# Patient Record
Sex: FEMALE | Race: WHITE | NOT HISPANIC OR LATINO | Employment: FULL TIME | URBAN - METROPOLITAN AREA
[De-identification: names, ages, dates, MRNs, and addresses within clinical notes are randomized per-mention and may not be internally consistent; named-entity substitution may affect disease eponyms.]

---

## 2017-01-12 ENCOUNTER — APPOINTMENT (OUTPATIENT)
Dept: ADMISSIONS | Facility: MEDICAL CENTER | Age: 67
End: 2017-01-12
Attending: INTERNAL MEDICINE
Payer: COMMERCIAL

## 2017-01-12 RX ORDER — ACETAMINOPHEN 160 MG
TABLET,DISINTEGRATING ORAL DAILY
COMMUNITY

## 2017-01-12 RX ORDER — CHOLECALCIFEROL (VITAMIN D3) 125 MCG
500 CAPSULE ORAL DAILY
COMMUNITY

## 2017-01-12 NOTE — OR NURSING
Preadmit appointment:  Patient instructed to continue regularly prescribed medications through day before surgery.  Instructed to take the following medications the  day of surgery with a sip of water per anesthesia protocol: Omeprazole,  Pt instructed to bring CPAP day of surgery

## 2017-01-20 ENCOUNTER — HOSPITAL ENCOUNTER (OUTPATIENT)
Facility: MEDICAL CENTER | Age: 67
End: 2017-01-20
Attending: INTERNAL MEDICINE | Admitting: INTERNAL MEDICINE
Payer: COMMERCIAL

## 2017-01-20 VITALS
TEMPERATURE: 98.8 F | WEIGHT: 191.36 LBS | SYSTOLIC BLOOD PRESSURE: 105 MMHG | OXYGEN SATURATION: 95 % | BODY MASS INDEX: 31.35 KG/M2 | DIASTOLIC BLOOD PRESSURE: 53 MMHG | HEART RATE: 79 BPM | RESPIRATION RATE: 16 BRPM

## 2017-01-20 PROBLEM — D13.2: Status: ACTIVE | Noted: 2017-01-20

## 2017-01-20 LAB
ANION GAP SERPL CALC-SCNC: 8 MMOL/L (ref 0–11.9)
BASOPHILS # BLD AUTO: 1 % (ref 0–1.8)
BASOPHILS # BLD: 0.04 K/UL (ref 0–0.12)
BUN SERPL-MCNC: 15 MG/DL (ref 8–22)
CALCIUM SERPL-MCNC: 9.6 MG/DL (ref 8.4–10.2)
CHLORIDE SERPL-SCNC: 105 MMOL/L (ref 96–112)
CO2 SERPL-SCNC: 25 MMOL/L (ref 20–33)
CREAT SERPL-MCNC: 0.92 MG/DL (ref 0.5–1.4)
EKG IMPRESSION: NORMAL
EOSINOPHIL # BLD AUTO: 0.09 K/UL (ref 0–0.51)
EOSINOPHIL NFR BLD: 2.2 % (ref 0–6.9)
ERYTHROCYTE [DISTWIDTH] IN BLOOD BY AUTOMATED COUNT: 45.2 FL (ref 35.9–50)
GFR SERPL CREATININE-BSD FRML MDRD: >60 ML/MIN/1.73 M 2
GLUCOSE SERPL-MCNC: 109 MG/DL (ref 65–99)
HCT VFR BLD AUTO: 36.4 % (ref 37–47)
HGB BLD-MCNC: 12 G/DL (ref 12–16)
IMM GRANULOCYTES # BLD AUTO: 0.01 K/UL (ref 0–0.11)
IMM GRANULOCYTES NFR BLD AUTO: 0.2 % (ref 0–0.9)
LYMPHOCYTES # BLD AUTO: 1.38 K/UL (ref 1–4.8)
LYMPHOCYTES NFR BLD: 34.2 % (ref 22–41)
MCH RBC QN AUTO: 28.4 PG (ref 27–33)
MCHC RBC AUTO-ENTMCNC: 33 G/DL (ref 33.6–35)
MCV RBC AUTO: 86.3 FL (ref 81.4–97.8)
MONOCYTES # BLD AUTO: 0.38 K/UL (ref 0–0.85)
MONOCYTES NFR BLD AUTO: 9.4 % (ref 0–13.4)
NEUTROPHILS # BLD AUTO: 2.13 K/UL (ref 2–7.15)
NEUTROPHILS NFR BLD: 53 % (ref 44–72)
NRBC # BLD AUTO: 0 K/UL
NRBC BLD AUTO-RTO: 0 /100 WBC
PATHOLOGY CONSULT NOTE: NORMAL
PLATELET # BLD AUTO: 177 K/UL (ref 164–446)
PMV BLD AUTO: 9.5 FL (ref 9–12.9)
POTASSIUM SERPL-SCNC: 3.7 MMOL/L (ref 3.6–5.5)
RBC # BLD AUTO: 4.22 M/UL (ref 4.2–5.4)
SODIUM SERPL-SCNC: 138 MMOL/L (ref 135–145)
WBC # BLD AUTO: 4 K/UL (ref 4.8–10.8)

## 2017-01-20 PROCEDURE — A4606 OXYGEN PROBE USED W OXIMETER: HCPCS | Performed by: INTERNAL MEDICINE

## 2017-01-20 PROCEDURE — 85025 COMPLETE CBC W/AUTO DIFF WBC: CPT

## 2017-01-20 PROCEDURE — 93010 ELECTROCARDIOGRAM REPORT: CPT | Performed by: INTERNAL MEDICINE

## 2017-01-20 PROCEDURE — 160035 HCHG PACU - 1ST 60 MINS PHASE I: Performed by: INTERNAL MEDICINE

## 2017-01-20 PROCEDURE — 160046 HCHG PACU - 1ST 60 MINS PHASE II: Performed by: INTERNAL MEDICINE

## 2017-01-20 PROCEDURE — 160036 HCHG PACU - EA ADDL 30 MINS PHASE I: Performed by: INTERNAL MEDICINE

## 2017-01-20 PROCEDURE — 80048 BASIC METABOLIC PNL TOTAL CA: CPT

## 2017-01-20 PROCEDURE — 160002 HCHG RECOVERY MINUTES (STAT): Performed by: INTERNAL MEDICINE

## 2017-01-20 PROCEDURE — 160009 HCHG ANES TIME/MIN: Performed by: INTERNAL MEDICINE

## 2017-01-20 PROCEDURE — 700101 HCHG RX REV CODE 250

## 2017-01-20 PROCEDURE — 700111 HCHG RX REV CODE 636 W/ 250 OVERRIDE (IP): Performed by: INTERNAL MEDICINE

## 2017-01-20 PROCEDURE — 93005 ELECTROCARDIOGRAM TRACING: CPT | Performed by: INTERNAL MEDICINE

## 2017-01-20 PROCEDURE — 160203 HCHG ENDO MINUTES - 1ST 30 MINS LEVEL 4: Performed by: INTERNAL MEDICINE

## 2017-01-20 PROCEDURE — 110371 HCHG SHELL REV 272: Performed by: INTERNAL MEDICINE

## 2017-01-20 PROCEDURE — 88305 TISSUE EXAM BY PATHOLOGIST: CPT

## 2017-01-20 PROCEDURE — 160025 RECOVERY II MINUTES (STATS): Performed by: INTERNAL MEDICINE

## 2017-01-20 PROCEDURE — 160048 HCHG OR STATISTICAL LEVEL 1-5: Performed by: INTERNAL MEDICINE

## 2017-01-20 PROCEDURE — 700111 HCHG RX REV CODE 636 W/ 250 OVERRIDE (IP)

## 2017-01-20 RX ORDER — SODIUM CHLORIDE, SODIUM LACTATE, POTASSIUM CHLORIDE, CALCIUM CHLORIDE 600; 310; 30; 20 MG/100ML; MG/100ML; MG/100ML; MG/100ML
1000 INJECTION, SOLUTION INTRAVENOUS CONTINUOUS
Status: DISCONTINUED | OUTPATIENT
Start: 2017-01-20 | End: 2017-01-20 | Stop reason: HOSPADM

## 2017-01-20 RX ORDER — EPINEPHRINE 0.1 MG/ML
SYRINGE (ML) INJECTION
Status: DISCONTINUED | OUTPATIENT
Start: 2017-01-20 | End: 2017-01-20 | Stop reason: HOSPADM

## 2017-01-20 RX ADMIN — SODIUM CHLORIDE, POTASSIUM CHLORIDE, SODIUM LACTATE AND CALCIUM CHLORIDE 1000 ML: 600; 310; 30; 20 INJECTION, SOLUTION INTRAVENOUS at 10:00

## 2017-01-20 ASSESSMENT — PAIN SCALES - GENERAL
PAINLEVEL_OUTOF10: 0

## 2017-01-20 NOTE — IP AVS SNAPSHOT
1/20/2017          Linda Walton  [address] [place] [address]    Dear :    [facility] wants to ensure your discharge home is safe and you or your loved ones have had all your questions answered regarding your care after you leave the hospital.    You may receive a telephone call within two days of your discharge.  This call is to make certain you understand your discharge instructions as well as ensure we provided you with the best care possible during your stay with us.     The call will only last approximately 3-5 minutes and will be done by a nurse.    Once again, we want to ensure your discharge home is safe and that you have a clear understanding of any next steps in your care.  If you have any questions or concerns, please do not hesitate to contact us, we are here for you.  Thank you for choosing [facility] for your healthcare needs.    Sincerely,    Nehemias Morin    [facility]

## 2017-01-20 NOTE — DISCHARGE INSTRUCTIONS
.ENDOSCOPY HOME CARE INSTRUCTIONS    GASTROSCOPY OR ERCP  1. Don't eat or drink anything for about an hour after the test. You can then resume your regular diet.  2. Don't drive or drink alcohol for 24 hours. The medication you received will make you too drowsy.  3. Don't take any coffee, tea, or aspirin products until after you see your doctor. These can harm the lining of your stomach.  4. If you begin to vomit bloody material, or develop black or bloody stools, call your doctor as soon as possible.  5. If you have any neck, chest, abdominal pain or temp of 100 degrees, call your doctor.  6. See your doctor in 7-14 days  7. Additional instructions: N/A  8. Prescriptions: None given      Dr. Barrett's Phone Number: [phone].     You should call [phone] if you develop problems with breathing or chest pain.  If any questions arise, call your doctor. If your doctor is not available, please feel free to call [phone]. You can also call the HEALTH HOTLINE open 24 hours/day, 7 days/week and speak to a nurse at [phone], or toll free [phone].    Depression / Suicide Risk    As you are discharged from this [facility] facility, it is important to learn how to keep safe from harming yourself.    Recognize the warning signs:  · Abrupt changes in personality, positive or negative- including increase in energy   · Giving away possessions  · Change in eating patterns- significant weight changes-  positive or negative  · Change in sleeping patterns- unable to sleep or sleeping all the time   · Unwillingness or inability to communicate  · Depression  · Unusual sadness, discouragement and loneliness  · Talk of wanting to die  · Neglect of personal appearance   · Rebelliousness- reckless behavior  · Withdrawal from people/activities they love  · Confusion- inability to concentrate     If you or a loved one observes any of these behaviors or has concerns about self-harm, here's what you can do:  · Talk about it-  your feelings and reasons for harming yourself  · Remove any means that you might use to hurt yourself (examples: pills, rope, extension cords, firearm)  · Get professional help from the community (Mental Health, Substance Abuse, psychological counseling)  · Do not be alone:Call your Safe Contact- someone whom you trust who will be there for you.  · Call your local CRISIS HOTLINE 116-1213 or 502-908-0195  · Call your local Children's Mobile Crisis Response Team [place] (352) 488-0063 or wwwCNS Response  · Call the toll free National Suicide Prevention Hotlines   · National Suicide Prevention Lifeline 580-648-LBRE (8885)  · National Hope Line Network 800-SUICIDE (740-9394)    I acknowledge receipt and understanding of these Home Care Instructions.    Discharge Education for patients on GENE (Obstructive Sleep Apnea) Protocol    Prior to receiving sedation or anesthesia, we screen all patients for Obstructive Sleep Apnea.  During your screening, you were identified as having suspected, but not confirmed Obstructive Sleep Apnea(GENE).    What is Obstructive Sleep Apnea?  Sleep apnea (AP-ne-ah) is a common disorder which involves breathing pauses that occur during sleep.  These can last from 10 seconds to a minute or longer.  Normal breathing resumes often with a loud snort or choking sound.    Sleep apnea occurs in all age groups and both genders but is more common in men and people over 40 years of age.  It has been estimated that as many as 18 million Americans have sleep apnea.  Most people who have sleep apnea don’t know they have it because it only occurs during sleep.  A family member and/or bed partner may first notice the signs of sleep apnea.  Sleep apnea is a chronic (ongoing) condition that disrupts the quality and quantity of your sleep repeatedly throughout the night.  This often results in excessive daytime sleepiness or fatigue during the day.  It may also contribute to high blood pressure,  heart problems, and complications following medications used for surgery and procedures.    To establish a definitive diagnosis, further testing from a specialist would be needed.  We recommend that you follow up with your primary care physician.    We recommend that you should be with an adult observer for at least 24 hours after your sedation/anesthesia.  If you have a CPAP machine, you should wear it during any sleep period (day or night) for the week following your procedure.  We encourage you to sleep on your side or in a sitting position, even with napping.  Lying flat on your back increases the risk of apnea and airway obstruction during your post procedure recovery period.    It is important to prevent over-sedation that could increase your risk for apnea.  Please take all pain medication as directed by your physician.  If you are not getting pain relief, please contact your physician to discuss possible approaches to relieving pain while minimizing medications that can affect your breathing and oxygen levels.

## 2017-01-20 NOTE — IP AVS SNAPSHOT
After Visit Summary                                                                                                                Name:Linda Walton  Medical Record Number:[id number]  CSN: [id number]    YOB: 1950   Age: 66 y.o.  Sex: female  HT:  WT: 86.8 kg (191 lb 5.8 oz)          Admit Date: 1/20/2017     Discharge Date:   Today's Date: 1/20/2017  Attending Doctor:  Edy Barrett M.D.                  Allergies:  Review of patient's allergies indicates no known allergies.                Discharge Instructions         .ENDOSCOPY HOME CARE INSTRUCTIONS    GASTROSCOPY OR ERCP  1. Don't eat or drink anything for about an hour after the test. You can then resume your regular diet.  2. Don't drive or drink alcohol for 24 hours. The medication you received will make you too drowsy.  3. Don't take any coffee, tea, or aspirin products until after you see your doctor. These can harm the lining of your stomach.  4. If you begin to vomit bloody material, or develop black or bloody stools, call your doctor as soon as possible.  5. If you have any neck, chest, abdominal pain or temp of 100 degrees, call your doctor.  6. See your doctor in 7-14 days  7. Additional instructions: N/A  8. Prescriptions: None given      Dr. Barrett's Phone Number: [phone].     You should call [phone] if you develop problems with breathing or chest pain.  If any questions arise, call your doctor. If your doctor is not available, please feel free to call [phone]. You can also call the HEALTH HOTLINE open 24 hours/day, 7 days/week and speak to a nurse at [phone], or toll free [phone].    Depression / Suicide Risk    As you are discharged from this [facility], it is important to learn how to keep safe from harming yourself.    Recognize the warning signs:  · Abrupt changes in personality, positive or negative- including increase in energy   · Giving away possessions  · Change in eating patterns-  significant weight changes-  positive or negative  · Change in sleeping patterns- unable to sleep or sleeping all the time   · Unwillingness or inability to communicate  · Depression  · Unusual sadness, discouragement and loneliness  · Talk of wanting to die  · Neglect of personal appearance   · Rebelliousness- reckless behavior  · Withdrawal from people/activities they love  · Confusion- inability to concentrate     If you or a loved one observes any of these behaviors or has concerns about self-harm, here's what you can do:  · Talk about it- your feelings and reasons for harming yourself  · Remove any means that you might use to hurt yourself (examples: pills, rope, extension cords, firearm)  · Get professional help from the community (Mental Health, Substance Abuse, psychological counseling)  · Do not be alone:Call your Safe Contact- someone whom you trust who will be there for you.  · Call your local CRISIS HOTLINE 250-1153 or 159-649-6557  · Call your local Children's Mobile Crisis Response Team [place] (314) 701-5723 or wwwSo[employer] Global  · Call the toll free National Suicide Prevention Hotlines   · National Suicide Prevention Lifeline 729-301-TORM (0682)  · National Hope Line Network 800-SUICIDE (690-0805)    I acknowledge receipt and understanding of these Home Care Instructions.    Discharge Education for patients on GENE (Obstructive Sleep Apnea) Protocol    Prior to receiving sedation or anesthesia, we screen all patients for Obstructive Sleep Apnea.  During your screening, you were identified as having suspected, but not confirmed Obstructive Sleep Apnea(GENE).    What is Obstructive Sleep Apnea?  Sleep apnea (AP-ne-ah) is a common disorder which involves breathing pauses that occur during sleep.  These can last from 10 seconds to a minute or longer.  Normal breathing resumes often with a loud snort or choking sound.    Sleep apnea occurs in all age groups and both genders but is more common in  men and people over 40 years of age.  It has been estimated that as many as 18 million Americans have sleep apnea.  Most people who have sleep apnea don’t know they have it because it only occurs during sleep.  A family member and/or bed partner may first notice the signs of sleep apnea.  Sleep apnea is a chronic (ongoing) condition that disrupts the quality and quantity of your sleep repeatedly throughout the night.  This often results in excessive daytime sleepiness or fatigue during the day.  It may also contribute to high blood pressure, heart problems, and complications following medications used for surgery and procedures.    To establish a definitive diagnosis, further testing from a specialist would be needed.  We recommend that you follow up with your primary care physician.    We recommend that you should be with an adult observer for at least 24 hours after your sedation/anesthesia.  If you have a CPAP machine, you should wear it during any sleep period (day or night) for the week following your procedure.  We encourage you to sleep on your side or in a sitting position, even with napping.  Lying flat on your back increases the risk of apnea and airway obstruction during your post procedure recovery period.    It is important to prevent over-sedation that could increase your risk for apnea.  Please take all pain medication as directed by your physician.  If you are not getting pain relief, please contact your physician to discuss possible approaches to relieving pain while minimizing medications that can affect your breathing and oxygen levels.             Medication List      ASK your doctor about these medications        Instructions    atorvastatin 40 MG Tabs   Commonly known as:  LIPITOR    Take 40 mg by mouth every evening.   Dose:  40 mg       citalopram 40 MG Tabs   Commonly known as:  CELEXA    Take 40 mg by mouth every day.   Dose:  40 mg       cyanocobalamin 500 MCG Tabs   Commonly known as:   VITAMIN B-12    Take 500 mcg by mouth every day.   Dose:  500 mcg       furosemide 40 MG Tabs   Commonly known as:  LASIX    Take 40 mg by mouth every day.   Dose:  40 mg       omeprazole 20 MG delayed-release capsule   Commonly known as:  PRILOSEC    Take 20 mg by mouth every day.   Dose:  20 mg       potassium chloride SA 20 MEQ Tbcr   Commonly known as:  K-DUR    Take 20 mEq by mouth every day.   Dose:  20 mEq       Vitamin D3 2000 UNIT Caps    Take  by mouth every day.               Medication Information     Above and/or attached are the medications your physician expects you to take upon discharge. Review all of your home medications and newly ordered medications with your doctor and/or pharmacist. Follow medication instructions as directed by your doctor and/or pharmacist. Please keep your medication list with you and share with your physician. Update the information when medications are discontinued, doses are changed, or new medications (including over-the-counter products) are added; and carry medication information at all times in the event of emergency situations.        Resources     Quit Smoking / Tobacco Use:    I understand the use of any tobacco products increases my chance of suffering from future heart disease or stroke and could cause other illnesses which may shorten my life. Quitting the use of tobacco products is the single most important thing I can do to improve my health. For further information on smoking / tobacco cessation call a Toll Free Quit Line at 1-733.217.3376 (*National Cancer [place]) or 1-593.338.8120 (American Lung Association) or you can access the web based program at www.lungusa.org.    [place] Tobacco Users Help Line:  (211) 591-4888       Toll Free: 1-460.535.1446  Quit Tobacco Program [facility] (962)766-9292    Crisis Hotline:    [place] Crisis Hotline:  6-269-TXDUWYW or 1-118.274.2553    [place] Crisis Hotline:    1-678.535.1058 or  190.884.9477    Discharge Survey:   Thank you for choosing [facility]. We hope we did everything we could to make your hospital stay a pleasant one. You may be receiving a survey and we would appreciate your time and participation in answering the questions. Your input is very valuable to us in our efforts to improve our service to our patients and their families.            Signatures     My signature on this form indicates that:    1. I acknowledge receipt and understanding of these Home Care Instruction.  2. My questions regarding this information have been answered to my satisfaction.  3. I have formulated a plan with my discharge nurse to obtain my prescribed medications for home.    __________________________________      __________________________________                   Patient Signature                                 Guardian/Responsible Adult Signature      __________________________________                 __________       ________                       Nurse Signature                                               Date                 Time

## 2017-01-20 NOTE — PROCEDURES
DATE OF SERVICES:  01/20/2017    TIME:  11:26 a.m.    ENDOSCOPIC PROCEDURES PERFORMED:  1.  Esophagogastroduodenoscopy with endoscopic mucosal resection.  2.  Esophagogastroduodenoscopy with submucosal methylene blue injection.  3.  Esophagogastroduodenoscopy with snare duodenal polypectomy.  4.  Esophagogastroduodenoscopy with Ovesco OTSC 11/6t post-polypectomy   closure.    ENDOSCOPIST:  Edy Barrett MD, [facility]    ANESTHESIA:  General anesthesia per Dr. Nathan Delgado.    CONSENT:  Risks, benefits, and alternatives were discussed with patient and   patient's .  They were given opportunity to ask questions and discuss   other options.  Risks including but not limited to perforation, infection,   bleeding, missed lesions, possible need for surgery, cardiopulmonary event,   aspiration, stroke, hospitalization, possibly prolonged, discomfort, possible   repeat procedures, additional testing, residual polyp and other potential   life-threatening complications.  Discussion was undertaken in layman's terms.    They stated clear understanding and acceptance of risks and wished to proceed   with procedures as detailed in this note.  Consent was given by them in clear   state of mind.    PROCEDURES IN DETAIL:  Diagnostic endoscope was inserted from mouth to second   portion of the duodenum.  Retroflexion was performed in stomach.  Duodenal   polyp was identified, which was sprayed with dilute methylene blue and then   injected with methylene blue with dilute epinephrine with good submucosal   lifting, a total of 13 mL was injected, and then a stiff snare was used to   remove the duodenal polyp in piecemeal fashion for endoscopic mucosal   resection snare polypectomy of cautery.  Specimens were retrieved and sent to   pathology.  The polypectomy defect was large with visible vessels and thus, an   Ovesco OTSC 11/6t was applied to post-polypectomy site engulfing the entire   site with good hemostasis and tissue  apposition.  The procedure was prolonged,   difficult, in particular the esophagogastroduodenoscopy with endoscopic   mucosal resection portion of this procedure due to the large size of the   duodenal polyp and took more than 40 minutes in total (modifier 22).  There   was suction insufflated air and stomach fluid contents upon removal.    ESOPHAGOGASTRODUODENOSCOPY FINDINGS:  1.  Esophagus:  Endoscopically unremarkable, no evidence of Bull's   esophagus.  2.  Stomach:  Incidental benign-appearing fundic gland polyps noted.  3.  Duodenum:  Duodenal flat polyp noted in the second portion of the duodenum   contralateral wall to ampulla of Vater, large size approximately 2 cm, subtle   edges with lateral spreading, previously biopsy proven to be adenoma   worrisome for low-grade dysplasia.  4.  Therapy:  Duodenal polyp removed with endoscopic mucosal resection after   submucosal methylene blue and dilute epinephrine saline injection with good   mucosal lifting.  Stiff snare polypectomy of cautery.  Specimens were   retrieved.  Post-polypectomy site closure with OTSC Ovesco 11/6t.    IMPRESSION:  1.  Large duodenal polyp, removed via endoscopic mucosal resection.  2.  Incidental gastric fundic polyps.    RECOMMENDATIONS:  1.  Follow pathology with me in clinic in about 8-10 weeks.  2.  Consider EGD for surveillance of duodenal polyp in approximately 1-2   years.       ____________________________________     MIKE BARROS MD    [facility] / [facility]    DD:  01/20/2017 11:32:45  DT:  01/20/2017 14:07:31    D#:  [id number]  Job#:  [id number]    cc: BUDDY LAROSE MD, ANNA RUSSELL PA-C, FENG ODEN DO

## 2017-01-20 NOTE — OR NURSING
1125 To PACU from [facility] via gurney, sleeping, respirations spontaneous and non-labored via OPA. Plan to keep pt in PACU for full hour per [facility] protocol.  1139 OPA removed, pt denies pain and nausea at this time. Pt falls asleep mid sentence. Will begin oxygen weaning at this time   1155 Pt resting, denies pain and nausea at this time   1210 Pt resting, denies pain and nausea at this time. Maintains oxygenation with no 02    1225 Pt meets criteria to transfer to PACU stage two

## 2017-01-23 NOTE — PROCEDURES
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    POSTOPERATIVE DIAGNOSES OR FINDINGS:  1.  Large duodenal polyp, removed via endoscopic mucosal resection.  2.  Incidental gastric fundic polyps, benign appearing.       ____________________________________     MD RAULITO TREJO / ALIDA    DD:  01/23/2017 10:34:11  DT:  01/23/2017 10:45:04    D#:  [id number]  Job#:  [id number]

## 2018-01-08 ENCOUNTER — HOSPITAL ENCOUNTER (OUTPATIENT)
Dept: LAB | Facility: MEDICAL CENTER | Age: 68
End: 2018-01-08
Attending: FAMILY MEDICINE
Payer: COMMERCIAL

## 2018-01-08 PROCEDURE — 36415 COLL VENOUS BLD VENIPUNCTURE: CPT

## 2018-01-08 PROCEDURE — 84460 ALANINE AMINO (ALT) (SGPT): CPT

## 2018-01-08 PROCEDURE — 82947 ASSAY GLUCOSE BLOOD QUANT: CPT

## 2018-01-08 PROCEDURE — 84450 TRANSFERASE (AST) (SGOT): CPT

## 2018-01-08 PROCEDURE — 80061 LIPID PANEL: CPT

## 2018-01-09 LAB
ALT SERPL-CCNC: 8 U/L (ref 2–50)
AST SERPL-CCNC: 14 U/L (ref 12–45)
CHOLEST SERPL-MCNC: 225 MG/DL (ref 100–199)
GLUCOSE SERPL-MCNC: 100 MG/DL (ref 65–99)
HDLC SERPL-MCNC: 76 MG/DL
LDLC SERPL CALC-MCNC: 137 MG/DL
TRIGL SERPL-MCNC: 58 MG/DL (ref 0–149)

## 2018-06-27 ENCOUNTER — HOSPITAL ENCOUNTER (OUTPATIENT)
Dept: LAB | Facility: MEDICAL CENTER | Age: 68
End: 2018-06-27
Attending: FAMILY MEDICINE
Payer: COMMERCIAL

## 2018-06-27 LAB
ALT SERPL-CCNC: 8 U/L (ref 2–50)
AST SERPL-CCNC: 17 U/L (ref 12–45)
CHOLEST SERPL-MCNC: 204 MG/DL (ref 100–199)
GLUCOSE SERPL-MCNC: 96 MG/DL (ref 65–99)
HDLC SERPL-MCNC: 80 MG/DL
LDLC SERPL CALC-MCNC: 109 MG/DL
TRIGL SERPL-MCNC: 77 MG/DL (ref 0–149)

## 2018-06-27 PROCEDURE — 82947 ASSAY GLUCOSE BLOOD QUANT: CPT

## 2018-06-27 PROCEDURE — 84460 ALANINE AMINO (ALT) (SGPT): CPT

## 2018-06-27 PROCEDURE — 84450 TRANSFERASE (AST) (SGOT): CPT

## 2018-06-27 PROCEDURE — 36415 COLL VENOUS BLD VENIPUNCTURE: CPT

## 2018-06-27 PROCEDURE — 80061 LIPID PANEL: CPT

## 2019-01-14 ENCOUNTER — HOSPITAL ENCOUNTER (OUTPATIENT)
Dept: LAB | Facility: MEDICAL CENTER | Age: 69
End: 2019-01-14
Attending: FAMILY MEDICINE
Payer: COMMERCIAL

## 2019-01-14 LAB
ALT SERPL-CCNC: 7 U/L (ref 2–50)
AST SERPL-CCNC: 14 U/L (ref 12–45)
CHOLEST SERPL-MCNC: 166 MG/DL (ref 100–199)
GLUCOSE SERPL-MCNC: 104 MG/DL (ref 65–99)
HDLC SERPL-MCNC: 66 MG/DL
LDLC SERPL CALC-MCNC: 87 MG/DL
TRIGL SERPL-MCNC: 67 MG/DL (ref 0–149)

## 2019-01-14 PROCEDURE — 82947 ASSAY GLUCOSE BLOOD QUANT: CPT

## 2019-01-14 PROCEDURE — 36415 COLL VENOUS BLD VENIPUNCTURE: CPT

## 2019-01-14 PROCEDURE — 84450 TRANSFERASE (AST) (SGOT): CPT

## 2019-01-14 PROCEDURE — 84460 ALANINE AMINO (ALT) (SGPT): CPT

## 2019-01-14 PROCEDURE — 80061 LIPID PANEL: CPT

## 2019-01-29 ENCOUNTER — OFFICE VISIT (OUTPATIENT)
Dept: MEDICAL GROUP | Facility: PHYSICIAN GROUP | Age: 69
End: 2019-01-29
Payer: COMMERCIAL

## 2019-01-29 VITALS
WEIGHT: 204 LBS | DIASTOLIC BLOOD PRESSURE: 78 MMHG | TEMPERATURE: 99.3 F | OXYGEN SATURATION: 100 % | BODY MASS INDEX: 32.78 KG/M2 | HEART RATE: 78 BPM | HEIGHT: 66 IN | SYSTOLIC BLOOD PRESSURE: 124 MMHG | RESPIRATION RATE: 14 BRPM

## 2019-01-29 DIAGNOSIS — R53.82 CHRONIC FATIGUE: ICD-10-CM

## 2019-01-29 DIAGNOSIS — R05.9 COUGH: ICD-10-CM

## 2019-01-29 PROCEDURE — 99214 OFFICE O/P EST MOD 30 MIN: CPT | Performed by: NURSE PRACTITIONER

## 2019-01-29 NOTE — ASSESSMENT & PLAN NOTE
Patient is a 69 y/o female with cough, hoarseness and constant throat clearing times 3 months.  Also having some fatigue but reports a chronic history of iron deficiency.  First noted that her symptoms started in November, symptoms have been waxing and waning since then.  She has not had any fevers.  She has had her flu shot and pneumonia shot.  She has no sick contacts.  She denies seasonal allergies though she does report near constant sneezing, watery eyes.  She also has reflux, on omeprazole 20 mg daily though she does admit that she has frequent reflux episodes despite daily PPI. She has not had any unintentional weight loss, drenching night sweats. No CP, SOB. She sees a regular doctor in [place], here today for a same day visit.

## 2019-01-29 NOTE — PROGRESS NOTES
[facility]  Primary Care Office Visit - Problem-Oriented        History:     Linda Walton is a 68 y.o. female who is here today to discuss Cough (x3mon SOB, sore throat, tired )      Cough  Patient is a 67 y/o female with cough, hoarseness and constant throat clearing times 3 months.  Also having some fatigue but reports a chronic history of iron deficiency.  First noted that her symptoms started in November, symptoms have been waxing and waning since then.  She has not had any fevers.  She has had her flu shot and pneumonia shot.  She has no sick contacts.  She denies seasonal allergies though she does report near constant sneezing, watery eyes.  She also has reflux, on omeprazole 20 mg daily though she does admit that she has frequent reflux episodes despite daily PPI. She has not had any unintentional weight loss, drenching night sweats. No CP, SOB. She sees a regular doctor in [place], here today for a same day visit.         Past Medical History:   Diagnosis Date   • Anxiety    • Depression    • Depression 3/24/2016   • DVT (deep venous thrombosis) (HCC) 3/24/2016    left leg   • Edema 3/24/2016   • GERD (gastroesophageal reflux disease)    • Heart burn    • Hiatus hernia syndrome    • High cholesterol    • Hyperlipidemia 3/24/2016   • Sleep apnea     CPAP   • Snoring    • Varicose vein of leg 3/24/2016     Past Surgical History:   Procedure Laterality Date   • GASTROSCOPY-ENDO N/A 1/20/2017    Procedure: GASTROSCOPY-ENDO W/ENDOSCOPIC MUCOSAL RESECTION OF DUODENAL ADENOMA AND OTHER POSSIBLE ENDOTHERAPY;  Surgeon: Edy Barrett M.D.;  Location: SURGERY [facility];  Service:    • TONSILLECTOMY  age 13     Social History     Social History   • Marital status:      Spouse name: N/A   • Number of children: N/A   • Years of education: N/A     Occupational History   • Not on file.     Social History Main Topics   • Smoking status: Former Smoker     Packs/day: 3.00     Years: 5.00      "Types: Cigarettes     Quit date: 1/1/1979   • Smokeless tobacco: Never Used   • Alcohol use 0.0 oz/week      Comment: once a month   • Drug use: No   • Sexual activity: Not Currently     Other Topics Concern   • Not on file     Social History Narrative     works from home     History   Smoking Status   • Former Smoker   • Packs/day: 3.00   • Years: 5.00   • Types: Cigarettes   • Quit date: 1/1/1979   Smokeless Tobacco   • Never Used     Family History   Problem Relation Age of Onset   • Alcohol/Drug Brother    • Cancer Father         lung   • Blood Disease Neg Hx      No Known Allergies    Problem List:     Patient Active Problem List    Diagnosis Date Noted   • Cough 01/29/2019   • 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   • DVT (deep venous thrombosis) (HCC) 03/24/2016   • Edema 03/24/2016   • Hyperlipidemia 03/24/2016   • Depression 03/24/2016   • Varicose vein of leg 03/24/2016         Medications:     Current Outpatient Prescriptions:   •  IRON PO, Take  by mouth., Disp: , Rfl:   •  Cholecalciferol (VITAMIN D3) 2000 UNIT Cap, Take  by mouth every day., Disp: , Rfl:   •  cyanocobalamin (VITAMIN B-12) 500 MCG Tab, Take 500 mcg by mouth every day., Disp: , Rfl:   •  atorvastatin (LIPITOR) 40 MG Tab, Take 40 mg by mouth every evening., Disp: , Rfl:   •  omeprazole (PRILOSEC) 20 MG CPDR, Take 20 mg by mouth every day., Disp: , Rfl:   •  furosemide (LASIX) 40 MG Tab, Take 40 mg by mouth every day., Disp: , Rfl:   •  potassium chloride SA (K-DUR) 20 MEQ Tab CR, Take 20 mEq by mouth every day., Disp: , Rfl:   •  citalopram (CELEXA) 40 MG TABS, Take 40 mg by mouth every day., Disp: , Rfl:       Review of Systems:     Pertinent positives as per HPI, all other systems reviewed and WNL     Physical Assessment:     VS: /78   Pulse 78   Temp 37.4 °C (99.3 °F)   Resp 14   Ht 1.664 m (5' 5.5\")   Wt 92.5 kg (204 lb)   SpO2 100%   BMI 33.43 kg/m²     General: Well-developed, well-nourished, female     Head: PERRL, " EOMI. Normocephalic, post oropharynx with PND otherwise WNL. Nasal mucosa erythematous and edematous. Patient clears throat throughout exam. No facial asymmetry noted.  Ears:Bilateral TMs with scant serous fluid.   Neck: Neck supple, no thyromegaly  Cardiovasc:  RRR, no MRG. No thrills or bruits. Pulses 2+ and symmetric at all distal extremities.  Pulmonary: Lungs clear bilaterally.  Normal respiratory effort. No wheeze or crackles.   Neuro: Alert and oriented, CNs II-XII intact, no focal deficits.  Skin:No rashes noted. Skin warm, dry, intact    Lymph: No cervical, pre- or post-auricular, submandibular, occipital lymphadenopathy.    Psych: Dressed appropriately for the weather, pleasant and conversant.  Affect, mood & judgment appropriate.      Assessment/Plan:    was seen today for cough.    Diagnoses and all orders for this visit:    Cough, ongoing, uncontrolled   - suspect that her cough may be multifactorial - history and PE findings particularly PND suggest that she may have underlying and untreated allergies.  Additionally, she has been having episodes of reflux which is apparently uncontrolled despite daily PPI.  Would recommend daily antihistamine along with twice daily dosing of the PPI times 2 weeks.  Follow-up with PCP if symptoms persist.    Chronic fatigue, uncontrolled  -Patient does have a history of iron deficiency, obtain labs.  We will contact the patient with results and recommendations, will defer additional workup to her PCP.  -     IRON/TOTAL IRON BIND; Future  -     CBC WITH DIFFERENTIAL; Future  -     FERRITIN; Future  -     COMP METABOLIC PANEL; Future  -     TSH WITH REFLEX TO FT4; Future  -     VITAMIN D,25 HYDROXY; Future      Patient is agreeable to the above plan and voiced understanding. All questions answered.     Please note that this dictation was created using voice recognition software. I have made every reasonable attempt to correct obvious errors, but I expect that there  are errors of grammar and possibly content that I did not discover before finalizing the note.      GLENYS Bull-JOHN  1/29/2019, 2:59 PM

## 2019-01-29 NOTE — PATIENT INSTRUCTIONS
To do:     - start prilosec twice a day + zyrtec every day x 2 weeks. If your cough doesn't improve call your doctor.

## 2019-01-30 ENCOUNTER — HOSPITAL ENCOUNTER (OUTPATIENT)
Dept: LAB | Facility: MEDICAL CENTER | Age: 69
End: 2019-01-30
Attending: NURSE PRACTITIONER
Payer: COMMERCIAL

## 2019-01-30 DIAGNOSIS — R53.82 CHRONIC FATIGUE: ICD-10-CM

## 2019-01-30 LAB
25(OH)D3 SERPL-MCNC: 26 NG/ML (ref 30–100)
ALBUMIN SERPL BCP-MCNC: 4.2 G/DL (ref 3.2–4.9)
ALBUMIN/GLOB SERPL: 1.7 G/DL
ALP SERPL-CCNC: 90 U/L (ref 30–99)
ALT SERPL-CCNC: 8 U/L (ref 2–50)
ANION GAP SERPL CALC-SCNC: 6 MMOL/L (ref 0–11.9)
ANISOCYTOSIS BLD QL SMEAR: ABNORMAL
AST SERPL-CCNC: 16 U/L (ref 12–45)
BASOPHILS # BLD AUTO: 1.8 % (ref 0–1.8)
BASOPHILS # BLD: 0.07 K/UL (ref 0–0.12)
BILIRUB SERPL-MCNC: 0.3 MG/DL (ref 0.1–1.5)
BUN SERPL-MCNC: 17 MG/DL (ref 8–22)
CALCIUM SERPL-MCNC: 9.9 MG/DL (ref 8.5–10.5)
CHLORIDE SERPL-SCNC: 109 MMOL/L (ref 96–112)
CO2 SERPL-SCNC: 26 MMOL/L (ref 20–33)
COMMENT 1642: NORMAL
CREAT SERPL-MCNC: 0.77 MG/DL (ref 0.5–1.4)
EOSINOPHIL # BLD AUTO: 0.14 K/UL (ref 0–0.51)
EOSINOPHIL NFR BLD: 3.6 % (ref 0–6.9)
ERYTHROCYTE [DISTWIDTH] IN BLOOD BY AUTOMATED COUNT: 53.7 FL (ref 35.9–50)
FASTING STATUS PATIENT QL REPORTED: NORMAL
FERRITIN SERPL-MCNC: 5.2 NG/ML (ref 10–291)
GIANT PLATELETS BLD QL SMEAR: NORMAL
GLOBULIN SER CALC-MCNC: 2.5 G/DL (ref 1.9–3.5)
GLUCOSE SERPL-MCNC: 99 MG/DL (ref 65–99)
HCT VFR BLD AUTO: 29.4 % (ref 37–47)
HGB BLD-MCNC: 7.9 G/DL (ref 12–16)
HYPOCHROMIA BLD QL SMEAR: ABNORMAL
IMM GRANULOCYTES # BLD AUTO: 0.02 K/UL (ref 0–0.11)
IMM GRANULOCYTES NFR BLD AUTO: 0.5 % (ref 0–0.9)
IRON SATN MFR SERPL: 3 % (ref 15–55)
IRON SERPL-MCNC: 13 UG/DL (ref 40–170)
LG PLATELETS BLD QL SMEAR: NORMAL
LYMPHOCYTES # BLD AUTO: 1.17 K/UL (ref 1–4.8)
LYMPHOCYTES NFR BLD: 30.1 % (ref 22–41)
MCH RBC QN AUTO: 21.2 PG (ref 27–33)
MCHC RBC AUTO-ENTMCNC: 26.9 G/DL (ref 33.6–35)
MCV RBC AUTO: 79 FL (ref 81.4–97.8)
MICROCYTES BLD QL SMEAR: ABNORMAL
MONOCYTES # BLD AUTO: 0.32 K/UL (ref 0–0.85)
MONOCYTES NFR BLD AUTO: 8.2 % (ref 0–13.4)
MORPHOLOGY BLD-IMP: NORMAL
NEUTROPHILS # BLD AUTO: 2.17 K/UL (ref 2–7.15)
NEUTROPHILS NFR BLD: 55.8 % (ref 44–72)
NRBC # BLD AUTO: 0 K/UL
NRBC BLD-RTO: 0 /100 WBC
OVALOCYTES BLD QL SMEAR: NORMAL
PLATELET # BLD AUTO: 262 K/UL (ref 164–446)
PLATELET BLD QL SMEAR: NORMAL
PMV BLD AUTO: 10.4 FL (ref 9–12.9)
POTASSIUM SERPL-SCNC: 4.3 MMOL/L (ref 3.6–5.5)
PROT SERPL-MCNC: 6.7 G/DL (ref 6–8.2)
RBC # BLD AUTO: 3.72 M/UL (ref 4.2–5.4)
RBC BLD AUTO: PRESENT
SODIUM SERPL-SCNC: 141 MMOL/L (ref 135–145)
STOMATOCYTES BLD QL SMEAR: NORMAL
TIBC SERPL-MCNC: 469 UG/DL (ref 250–450)
TSH SERPL DL<=0.005 MIU/L-ACNC: 2.23 UIU/ML (ref 0.38–5.33)
WBC # BLD AUTO: 3.9 K/UL (ref 4.8–10.8)

## 2019-01-30 PROCEDURE — 80053 COMPREHEN METABOLIC PANEL: CPT

## 2019-01-30 PROCEDURE — 36415 COLL VENOUS BLD VENIPUNCTURE: CPT

## 2019-01-30 PROCEDURE — 82306 VITAMIN D 25 HYDROXY: CPT

## 2019-01-30 PROCEDURE — 82728 ASSAY OF FERRITIN: CPT

## 2019-01-30 PROCEDURE — 83540 ASSAY OF IRON: CPT

## 2019-01-30 PROCEDURE — 84443 ASSAY THYROID STIM HORMONE: CPT

## 2019-01-30 PROCEDURE — 85025 COMPLETE CBC W/AUTO DIFF WBC: CPT

## 2019-01-30 PROCEDURE — 83550 IRON BINDING TEST: CPT

## 2019-03-08 ENCOUNTER — HOSPITAL ENCOUNTER (OUTPATIENT)
Dept: LAB | Facility: MEDICAL CENTER | Age: 69
End: 2019-03-08
Attending: INTERNAL MEDICINE
Payer: COMMERCIAL

## 2019-03-08 LAB
ANISOCYTOSIS BLD QL SMEAR: ABNORMAL
BASOPHILS # BLD AUTO: 1.7 % (ref 0–1.8)
BASOPHILS # BLD: 0.05 K/UL (ref 0–0.12)
EOSINOPHIL # BLD AUTO: 0.08 K/UL (ref 0–0.51)
EOSINOPHIL NFR BLD: 2.6 % (ref 0–6.9)
ERYTHROCYTE [DISTWIDTH] IN BLOOD BY AUTOMATED COUNT: 75.2 FL (ref 35.9–50)
FOLATE SERPL-MCNC: 20.2 NG/ML
HCT VFR BLD AUTO: 40 % (ref 37–47)
HGB BLD-MCNC: 12.4 G/DL (ref 12–16)
LYMPHOCYTES # BLD AUTO: 1.08 K/UL (ref 1–4.8)
LYMPHOCYTES NFR BLD: 33.9 % (ref 22–41)
MANUAL DIFF BLD: NORMAL
MCH RBC QN AUTO: 26.2 PG (ref 27–33)
MCHC RBC AUTO-ENTMCNC: 31 G/DL (ref 33.6–35)
MCV RBC AUTO: 84.6 FL (ref 81.4–97.8)
MICROCYTES BLD QL SMEAR: ABNORMAL
MONOCYTES # BLD AUTO: 0.28 K/UL (ref 0–0.85)
MONOCYTES NFR BLD AUTO: 8.7 % (ref 0–13.4)
MORPHOLOGY BLD-IMP: NORMAL
NEUTROPHILS # BLD AUTO: 1.7 K/UL (ref 2–7.15)
NEUTROPHILS NFR BLD: 53.1 % (ref 44–72)
NRBC # BLD AUTO: 0 K/UL
NRBC BLD-RTO: 0 /100 WBC
OVALOCYTES BLD QL SMEAR: NORMAL
PLATELET # BLD AUTO: 180 K/UL (ref 164–446)
PLATELET BLD QL SMEAR: NORMAL
PMV BLD AUTO: 10.4 FL (ref 9–12.9)
POIKILOCYTOSIS BLD QL SMEAR: NORMAL
RBC # BLD AUTO: 4.73 M/UL (ref 4.2–5.4)
RBC BLD AUTO: PRESENT
SMUDGE CELLS BLD QL SMEAR: NORMAL
VIT B12 SERPL-MCNC: 372 PG/ML (ref 211–911)
WBC # BLD AUTO: 3.2 K/UL (ref 4.8–10.8)

## 2019-03-08 PROCEDURE — 82746 ASSAY OF FOLIC ACID SERUM: CPT

## 2019-03-08 PROCEDURE — 36415 COLL VENOUS BLD VENIPUNCTURE: CPT

## 2019-03-08 PROCEDURE — 82607 VITAMIN B-12: CPT

## 2019-03-08 PROCEDURE — 85027 COMPLETE CBC AUTOMATED: CPT

## 2019-03-08 PROCEDURE — 85007 BL SMEAR W/DIFF WBC COUNT: CPT

## 2020-09-02 ENCOUNTER — APPOINTMENT (RX ONLY)
Dept: URBAN - NONMETROPOLITAN AREA CLINIC 15 | Facility: CLINIC | Age: 70
Setting detail: DERMATOLOGY
End: 2020-09-02

## 2020-09-02 DIAGNOSIS — L82.1 OTHER SEBORRHEIC KERATOSIS: ICD-10-CM

## 2020-09-02 DIAGNOSIS — D22 MELANOCYTIC NEVI: ICD-10-CM

## 2020-09-02 DIAGNOSIS — D18.0 HEMANGIOMA: ICD-10-CM

## 2020-09-02 DIAGNOSIS — Z71.89 OTHER SPECIFIED COUNSELING: ICD-10-CM

## 2020-09-02 DIAGNOSIS — L81.4 OTHER MELANIN HYPERPIGMENTATION: ICD-10-CM

## 2020-09-02 DIAGNOSIS — L91.8 OTHER HYPERTROPHIC DISORDERS OF THE SKIN: ICD-10-CM

## 2020-09-02 DIAGNOSIS — L73.8 OTHER SPECIFIED FOLLICULAR DISORDERS: ICD-10-CM

## 2020-09-02 PROBLEM — D22.71 MELANOCYTIC NEVI OF RIGHT LOWER LIMB, INCLUDING HIP: Status: ACTIVE | Noted: 2020-09-02

## 2020-09-02 PROBLEM — D18.01 HEMANGIOMA OF SKIN AND SUBCUTANEOUS TISSUE: Status: ACTIVE | Noted: 2020-09-02

## 2020-09-02 PROBLEM — D22.61 MELANOCYTIC NEVI OF RIGHT UPPER LIMB, INCLUDING SHOULDER: Status: ACTIVE | Noted: 2020-09-02

## 2020-09-02 PROBLEM — D22.5 MELANOCYTIC NEVI OF TRUNK: Status: ACTIVE | Noted: 2020-09-02

## 2020-09-02 PROBLEM — D22.72 MELANOCYTIC NEVI OF LEFT LOWER LIMB, INCLUDING HIP: Status: ACTIVE | Noted: 2020-09-02

## 2020-09-02 PROBLEM — D22.62 MELANOCYTIC NEVI OF LEFT UPPER LIMB, INCLUDING SHOULDER: Status: ACTIVE | Noted: 2020-09-02

## 2020-09-02 PROCEDURE — 99203 OFFICE O/P NEW LOW 30 MIN: CPT

## 2020-09-02 PROCEDURE — ? LIQUID NITROGEN

## 2020-09-02 PROCEDURE — ? COUNSELING

## 2020-09-02 ASSESSMENT — LOCATION DETAILED DESCRIPTION DERM
LOCATION DETAILED: RIGHT ANTECUBITAL SKIN
LOCATION DETAILED: RIGHT POPLITEAL SKIN
LOCATION DETAILED: RIGHT CLAVICULAR NECK
LOCATION DETAILED: RIGHT ANTERIOR SHOULDER
LOCATION DETAILED: RIGHT INFERIOR CENTRAL MALAR CHEEK
LOCATION DETAILED: INFERIOR THORACIC SPINE
LOCATION DETAILED: RIGHT VENTRAL PROXIMAL FOREARM
LOCATION DETAILED: LEFT ANTERIOR PROXIMAL UPPER ARM
LOCATION DETAILED: XIPHOID
LOCATION DETAILED: RIGHT AXILLARY VAULT
LOCATION DETAILED: LEFT VENTRAL LATERAL PROXIMAL FOREARM
LOCATION DETAILED: LEFT INFERIOR LATERAL MIDBACK
LOCATION DETAILED: LEFT SUPERIOR UPPER BACK
LOCATION DETAILED: RIGHT ANTERIOR DISTAL UPPER ARM
LOCATION DETAILED: SUBXIPHOID
LOCATION DETAILED: LEFT INFERIOR MEDIAL UPPER BACK
LOCATION DETAILED: RIGHT ANTERIOR PROXIMAL UPPER ARM
LOCATION DETAILED: LEFT PROXIMAL PRETIBIAL REGION
LOCATION DETAILED: LEFT ANTERIOR DISTAL UPPER ARM
LOCATION DETAILED: LEFT DISTAL POSTERIOR THIGH
LOCATION DETAILED: RIGHT PROXIMAL PRETIBIAL REGION
LOCATION DETAILED: LEFT SUPERIOR MEDIAL FOREHEAD
LOCATION DETAILED: LEFT POSTERIOR SHOULDER
LOCATION DETAILED: RIGHT DISTAL POSTERIOR THIGH
LOCATION DETAILED: LEFT POPLITEAL SKIN
LOCATION DETAILED: LEFT SUPERIOR MEDIAL MIDBACK
LOCATION DETAILED: RIGHT FOREHEAD
LOCATION DETAILED: LEFT VENTRAL PROXIMAL FOREARM
LOCATION DETAILED: MIDDLE STERNUM
LOCATION DETAILED: LEFT MID-UPPER BACK
LOCATION DETAILED: RIGHT MEDIAL SUPERIOR CHEST
LOCATION DETAILED: LEFT LATERAL PROXIMAL PRETIBIAL REGION

## 2020-09-02 ASSESSMENT — LOCATION SIMPLE DESCRIPTION DERM
LOCATION SIMPLE: LEFT FOREARM
LOCATION SIMPLE: RIGHT POSTERIOR THIGH
LOCATION SIMPLE: RIGHT FOREARM
LOCATION SIMPLE: LEFT LOWER BACK
LOCATION SIMPLE: LEFT UPPER ARM
LOCATION SIMPLE: LEFT SHOULDER
LOCATION SIMPLE: RIGHT UPPER ARM
LOCATION SIMPLE: RIGHT AXILLARY VAULT
LOCATION SIMPLE: LEFT POSTERIOR THIGH
LOCATION SIMPLE: RIGHT POPLITEAL SKIN
LOCATION SIMPLE: ABDOMEN
LOCATION SIMPLE: RIGHT PRETIBIAL REGION
LOCATION SIMPLE: RIGHT SHOULDER
LOCATION SIMPLE: LEFT UPPER BACK
LOCATION SIMPLE: UPPER BACK
LOCATION SIMPLE: RIGHT ANTERIOR NECK
LOCATION SIMPLE: RIGHT FOREHEAD
LOCATION SIMPLE: CHEST
LOCATION SIMPLE: LEFT PRETIBIAL REGION
LOCATION SIMPLE: LEFT POPLITEAL SKIN
LOCATION SIMPLE: LEFT FOREHEAD
LOCATION SIMPLE: RIGHT CHEEK

## 2020-09-02 ASSESSMENT — LOCATION ZONE DERM
LOCATION ZONE: NECK
LOCATION ZONE: ARM
LOCATION ZONE: TRUNK
LOCATION ZONE: FACE
LOCATION ZONE: LEG
LOCATION ZONE: AXILLAE

## 2020-09-02 NOTE — PROCEDURE: LIQUID NITROGEN
Duration Of Freeze Thaw-Cycle (Seconds): 0
Render Post-Care Instructions In Note?: no
Post-Care Instructions: I reviewed with the patient in detail post-care instructions. Patient is to wear sunprotection, and avoid picking at any of the treated lesions. Pt may apply Vaseline to crusted or scabbing areas.
Consent: The patient's consent was obtained including but not limited to risks of crusting, scabbing, blistering, scarring, darker or lighter pigmentary change, recurrence, incomplete removal and infection.
Detail Level: Detailed
Medical Necessity Clause: This procedure was medically necessary because the lesions that were treated were:  If lesion does not resolve, bx is needed.
Medical Necessity Information: It is in your best interest to select a reason for this procedure from the list below. All of these items fulfill various CMS LCD requirements except the new and changing color options.

## 2020-09-02 NOTE — HPI: FULL BODY SKIN EXAMINATION
How Severe Are Your Spot(S)?: moderate
What Type Of Note Output Would You Prefer (Optional)?: Standard Output
What Is The Reason For Today's Visit?: Full Body Skin Examination
What Is The Reason For Today's Visit? (Being Monitored For X): concerning skin lesions on an annual basis
no weight-bearing restrictions

## 2020-11-09 NOTE — OR NURSING
Chief Complaint   Patient presents with     RECHECK     followup to repeat right knee aspiration and lab work        82 year old  1938            Pain Assessment  Patient Currently in Pain: No(no pain per pt)                [facility] - [place], [place] - [address]  [id number] - [place], [place] - [address] AT [facility] [address]  [facility] - [place], [place] - [address]    Allergies   Allergen Reactions     Colchicine      Other reaction(s): Gastrointestinal  diarrhea     Niacin      skin rash     Ferrous Sulfate Rash       Current Outpatient Medications   Medication     levothyroxine (LEVOTHROID) 75 MCG tablet     acetaminophen (TYLENOL) 325 MG tablet     amoxicillin (AMOXIL) 500 MG tablet     amoxicillin-clavulanate (AUGMENTIN) 500-125 MG tablet     aspirin (ASA) 325 MG EC tablet     oxyCODONE (ROXICODONE) 5 MG tablet     OXYCODONE HCL PO     senna-docusate (SENOKOT-S/PERICOLACE) 8.6-50 MG tablet     sulfamethoxazole-trimethoprim (BACTRIM DS) 800-160 MG tablet     No current facility-administered medications for this visit.              KOOS Knee Survey Assessment    Knee Outcome Survey ADL Scale (Radha, JTORIN; Ayesha, L; Hector, RS; Aristides, FH; Marisel, CD; 1998) 7/9/2018   Do you have swelling in your knee? Often   Do you feel grinding, hear clicking or any other type of noise when your knee moves? Never   Does your knee catch or hang up when moving? Never   Can you straighten your knee fully? Always   Can you bend your knee fully? Always   How severe is your knee joint stiffness after first wakening in the morning? Mild   How severe is your knee stiffness after sitting, lying or resting LATER IN THE DAY? Mild   How often do you experience knee pain? Daily   Twisting/pivoting on your knee None   Straightening knee fully None   Bending knee fully None   Walking on flat surface None   Going up or down stairs Mild   At night  Patient allergies and NPO status verified, home medication reconciliation completed, belongings secured. Patient verbalizes understanding of pain scale, expected course of stay and plan of care. Surgical site verified with patient, IV access established. Pt awaiting surgery.      while in bed None   Sitting or lying None   Standing upright None   Descending stairs Moderate   Ascending stairs Moderate   Rising from sitting None   Standing None   Bending to floor/ an object None   Walking on flat surface None   Getting in/out of car None   Going shopping Moderate   Putting on socks/stockings None   Rising from bed None   Taking off socks/stockings None   Lying in bed (turning over, maintaining knee position) None   Getting in/out of bath None   Sitting None   Getting on/off toilet None   Heavy domestic duties (moving heavy boxes, scrubbing floors, etc) Mild   Light domestic duties (cooking, dusting, etc) None   Squatting Extreme   Running Extreme   Jumping Extreme   Twisting/pivoting on your injured knee Moderate   Kneeling Moderate   How often are you aware of your knee problem? Daily   Have you modified your life style to avoid potentially damaging activities to your knee? Totally   How much are you troubled with lack of confidence in your knee? Mildly   In general, how much difficulty do you have with your knee? Mild   Pain Score 88.88   Symptoms Score 53.57   Function, Daily Living Score 89.7   Sports and Rec Score 20   Quality of Life Score 43.75              Promis 10 Assessment    PROMIS 10 9/6/2020   In general, would you say your health is: Very good   In general, would you say your quality of life is: Very good   In general, how would you rate your physical health? Very good   In general, how would you rate your mental health, including your mood and your ability to think? Very good   In general, how would you rate your satisfaction with your social activities and relationships? Very good   In general, please rate how well you carry out your usual social activities and roles Very good   To what extent are you able to carry out your everyday physical activities such as walking, climbing stairs, carrying groceries, or moving a chair? A little   How often have you been bothered  by emotional problems such as feeling anxious, depressed or irritable? Rarely   How would you rate your fatigue on average? Mild   How would you rate your pain on average?   0 = No Pain  to  10 = Worst Imaginable Pain 1   In general, would you say your health is: 4   In general, would you say your quality of life is: 4   In general, how would you rate your physical health? 4   In general, how would you rate your mental health, including your mood and your ability to think? 4   In general, how would you rate your satisfaction with your social activities and relationships? 4   In general, please rate how well you carry out your usual social activities and roles. (This includes activities at home, at work and in your community, and responsibilities as a parent, child, spouse, employee, friend, etc.) 4   To what extent are you able to carry out your everyday physical activities such as walking, climbing stairs, carrying groceries, or moving a chair? 2   In the past 7 days, how often have you been bothered by emotional problems such as feeling anxious, depressed, or irritable? 2   In the past 7 days, how would you rate your fatigue on average? 2   In the past 7 days, how would you rate your pain on average, where 0 means no pain, and 10 means worst imaginable pain? 1   Global Mental Health Score 16   Global Physical Health Score 14   PROMIS TOTAL - SUBSCORES 30   Some recent data might be hidden              Ortho Oxford Knee Questionnaire    Oxford Knee Score (  [employer], 1998. All rights reserved) - Problems with knee during last 4 Weeks 9/6/2020   1.  How would you describe the pain you usually have from your knee? Very Mild   2.  Have you had any trouble with washing and drying yourself (all over) because of your knee? No trouble at all   3.  Have you had any trouble getting in and out of a car or using public transportation because of your knee? (whichever you would tend to use) Very little trouble  "  4.  For how long have you been able to walk before pain from you knee becomes severe? (with or without cane) Around the house only   5.  After a meal (sitting at a table), how painful has it been for you to stand up from a chair because of your knee? Not at all painful   6.  Have you been limping when walking because of your knee? All of the time   7.  Could you kneel down and get up again afterwards? With moderate difficulty   8.  Have you been troubled by pain from your knee in bed at night? Only 1 or 2 nights   9.  How much has pain from your knee interfered with your usual work (including housework)? Moderately   10. Have you felt that your knee might suddenly \"give out\" or let you down? Rarely/never   11. Could you do the grocery shopping on your own? No, impossible   12. Could you walk down one flight of stairs? With extreme difficulty   OXFORD TOTAL SCORE 27          "

## 2021-03-03 ENCOUNTER — APPOINTMENT (RX ONLY)
Dept: URBAN - NONMETROPOLITAN AREA CLINIC 15 | Facility: CLINIC | Age: 71
Setting detail: DERMATOLOGY
End: 2021-03-03

## 2021-03-03 DIAGNOSIS — L81.4 OTHER MELANIN HYPERPIGMENTATION: ICD-10-CM

## 2021-03-03 DIAGNOSIS — L82.1 OTHER SEBORRHEIC KERATOSIS: ICD-10-CM

## 2021-03-03 DIAGNOSIS — L57.0 ACTINIC KERATOSIS: ICD-10-CM

## 2021-03-03 DIAGNOSIS — D22 MELANOCYTIC NEVI: ICD-10-CM

## 2021-03-03 DIAGNOSIS — D18.0 HEMANGIOMA: ICD-10-CM

## 2021-03-03 PROBLEM — D22.72 MELANOCYTIC NEVI OF LEFT LOWER LIMB, INCLUDING HIP: Status: ACTIVE | Noted: 2021-03-03

## 2021-03-03 PROBLEM — D22.71 MELANOCYTIC NEVI OF RIGHT LOWER LIMB, INCLUDING HIP: Status: ACTIVE | Noted: 2021-03-03

## 2021-03-03 PROBLEM — D22.61 MELANOCYTIC NEVI OF RIGHT UPPER LIMB, INCLUDING SHOULDER: Status: ACTIVE | Noted: 2021-03-03

## 2021-03-03 PROBLEM — D22.62 MELANOCYTIC NEVI OF LEFT UPPER LIMB, INCLUDING SHOULDER: Status: ACTIVE | Noted: 2021-03-03

## 2021-03-03 PROBLEM — D18.01 HEMANGIOMA OF SKIN AND SUBCUTANEOUS TISSUE: Status: ACTIVE | Noted: 2021-03-03

## 2021-03-03 PROBLEM — D22.5 MELANOCYTIC NEVI OF TRUNK: Status: ACTIVE | Noted: 2021-03-03

## 2021-03-03 PROCEDURE — 17000 DESTRUCT PREMALG LESION: CPT

## 2021-03-03 PROCEDURE — ? LIQUID NITROGEN

## 2021-03-03 PROCEDURE — ? COUNSELING

## 2021-03-03 PROCEDURE — 99213 OFFICE O/P EST LOW 20 MIN: CPT | Mod: 25

## 2021-03-03 ASSESSMENT — LOCATION SIMPLE DESCRIPTION DERM
LOCATION SIMPLE: NOSE
LOCATION SIMPLE: LEFT POSTERIOR THIGH
LOCATION SIMPLE: LEFT PRETIBIAL REGION
LOCATION SIMPLE: LEFT FOREHEAD
LOCATION SIMPLE: RIGHT PRETIBIAL REGION
LOCATION SIMPLE: LEFT ELBOW
LOCATION SIMPLE: LEFT POPLITEAL SKIN
LOCATION SIMPLE: ABDOMEN
LOCATION SIMPLE: RIGHT THIGH
LOCATION SIMPLE: RIGHT FOREARM
LOCATION SIMPLE: UPPER BACK
LOCATION SIMPLE: RIGHT EYEBROW
LOCATION SIMPLE: LEFT FOREARM
LOCATION SIMPLE: LEFT POSTERIOR UPPER ARM
LOCATION SIMPLE: RIGHT UPPER BACK
LOCATION SIMPLE: CHEST
LOCATION SIMPLE: RIGHT POSTERIOR UPPER ARM
LOCATION SIMPLE: RIGHT POSTERIOR THIGH
LOCATION SIMPLE: RIGHT POPLITEAL SKIN

## 2021-03-03 ASSESSMENT — LOCATION DETAILED DESCRIPTION DERM
LOCATION DETAILED: RIGHT ANTERIOR DISTAL THIGH
LOCATION DETAILED: RIGHT SUPERIOR UPPER BACK
LOCATION DETAILED: RIGHT DISTAL POSTERIOR UPPER ARM
LOCATION DETAILED: NASAL DORSUM
LOCATION DETAILED: LEFT LATERAL ELBOW
LOCATION DETAILED: RIGHT LATERAL SUPERIOR CHEST
LOCATION DETAILED: MIDDLE STERNUM
LOCATION DETAILED: RIGHT CENTRAL EYEBROW
LOCATION DETAILED: RIGHT VENTRAL PROXIMAL FOREARM
LOCATION DETAILED: SUBXIPHOID
LOCATION DETAILED: RIGHT PROXIMAL DORSAL FOREARM
LOCATION DETAILED: INFERIOR THORACIC SPINE
LOCATION DETAILED: LEFT DISTAL POSTERIOR THIGH
LOCATION DETAILED: RIGHT DISTAL POSTERIOR THIGH
LOCATION DETAILED: RIGHT POPLITEAL SKIN
LOCATION DETAILED: LEFT DISTAL DORSAL FOREARM
LOCATION DETAILED: LEFT VENTRAL LATERAL PROXIMAL FOREARM
LOCATION DETAILED: LEFT DISTAL POSTERIOR UPPER ARM
LOCATION DETAILED: RIGHT PROXIMAL POSTERIOR UPPER ARM
LOCATION DETAILED: LEFT PROXIMAL PRETIBIAL REGION
LOCATION DETAILED: LEFT POPLITEAL SKIN
LOCATION DETAILED: LEFT INFERIOR FOREHEAD
LOCATION DETAILED: LOWER STERNUM
LOCATION DETAILED: RIGHT PROXIMAL PRETIBIAL REGION

## 2021-03-03 ASSESSMENT — LOCATION ZONE DERM
LOCATION ZONE: LEG
LOCATION ZONE: TRUNK
LOCATION ZONE: ARM
LOCATION ZONE: FACE
LOCATION ZONE: NOSE

## 2021-03-03 NOTE — PROCEDURE: COUNSELING
Detail Level: Zone
Patient Specific Counseling (Will Not Stick From Patient To Patient): Recommended Eucerin rough and bumpy or urea cream; patient states that Cera Ve is not helping.  Discussed that if creams do not help then can cryo lesions, but concerned that is will not heal well due to the location.

## 2022-11-22 ENCOUNTER — APPOINTMENT (RX ONLY)
Dept: URBAN - NONMETROPOLITAN AREA CLINIC 15 | Facility: CLINIC | Age: 72
Setting detail: DERMATOLOGY
End: 2022-11-22

## 2022-11-22 DIAGNOSIS — L82.1 OTHER SEBORRHEIC KERATOSIS: ICD-10-CM

## 2022-11-22 DIAGNOSIS — L73.8 OTHER SPECIFIED FOLLICULAR DISORDERS: ICD-10-CM

## 2022-11-22 DIAGNOSIS — D22 MELANOCYTIC NEVI: ICD-10-CM

## 2022-11-22 DIAGNOSIS — L81.4 OTHER MELANIN HYPERPIGMENTATION: ICD-10-CM

## 2022-11-22 DIAGNOSIS — D18.0 HEMANGIOMA: ICD-10-CM

## 2022-11-22 PROBLEM — D22.71 MELANOCYTIC NEVI OF RIGHT LOWER LIMB, INCLUDING HIP: Status: ACTIVE | Noted: 2022-11-22

## 2022-11-22 PROBLEM — D22.62 MELANOCYTIC NEVI OF LEFT UPPER LIMB, INCLUDING SHOULDER: Status: ACTIVE | Noted: 2022-11-22

## 2022-11-22 PROBLEM — D18.01 HEMANGIOMA OF SKIN AND SUBCUTANEOUS TISSUE: Status: ACTIVE | Noted: 2022-11-22

## 2022-11-22 PROBLEM — D22.72 MELANOCYTIC NEVI OF LEFT LOWER LIMB, INCLUDING HIP: Status: ACTIVE | Noted: 2022-11-22

## 2022-11-22 PROBLEM — D22.5 MELANOCYTIC NEVI OF TRUNK: Status: ACTIVE | Noted: 2022-11-22

## 2022-11-22 PROBLEM — D22.61 MELANOCYTIC NEVI OF RIGHT UPPER LIMB, INCLUDING SHOULDER: Status: ACTIVE | Noted: 2022-11-22

## 2022-11-22 PROCEDURE — ? COUNSELING

## 2022-11-22 PROCEDURE — ? LIQUID NITROGEN

## 2022-11-22 PROCEDURE — 99213 OFFICE O/P EST LOW 20 MIN: CPT

## 2022-11-22 ASSESSMENT — LOCATION DETAILED DESCRIPTION DERM
LOCATION DETAILED: RIGHT SUPERIOR MEDIAL UPPER BACK
LOCATION DETAILED: RIGHT MID-UPPER BACK
LOCATION DETAILED: RIGHT DISTAL POSTERIOR UPPER ARM
LOCATION DETAILED: SUBXIPHOID
LOCATION DETAILED: RIGHT SUPERIOR LATERAL MIDBACK
LOCATION DETAILED: INFERIOR THORACIC SPINE
LOCATION DETAILED: RIGHT INFRAMAMMARY CREASE (INNER QUADRANT)
LOCATION DETAILED: LEFT PROXIMAL PRETIBIAL REGION
LOCATION DETAILED: RIGHT INFERIOR LATERAL UPPER BACK
LOCATION DETAILED: RIGHT LATERAL UPPER BACK
LOCATION DETAILED: RIGHT PROXIMAL DORSAL FOREARM
LOCATION DETAILED: LEFT SUPERIOR LATERAL MIDBACK
LOCATION DETAILED: LEFT MID-UPPER BACK
LOCATION DETAILED: MIDDLE STERNUM
LOCATION DETAILED: RIGHT VENTRAL PROXIMAL FOREARM
LOCATION DETAILED: RIGHT PROXIMAL PRETIBIAL REGION
LOCATION DETAILED: LEFT LATERAL ELBOW
LOCATION DETAILED: LEFT VENTRAL LATERAL PROXIMAL FOREARM
LOCATION DETAILED: RIGHT MEDIAL BREAST 5-6:00 REGION
LOCATION DETAILED: NASAL SUPRATIP
LOCATION DETAILED: LEFT INFERIOR FOREHEAD
LOCATION DETAILED: RIGHT DISTAL POSTERIOR THIGH
LOCATION DETAILED: LEFT INFERIOR LATERAL UPPER BACK
LOCATION DETAILED: EPIGASTRIC SKIN
LOCATION DETAILED: LEFT DISTAL DORSAL FOREARM
LOCATION DETAILED: LEFT INFERIOR UPPER BACK
LOCATION DETAILED: RIGHT LATERAL SUPERIOR CHEST
LOCATION DETAILED: RIGHT CENTRAL EYEBROW
LOCATION DETAILED: RIGHT RIB CAGE
LOCATION DETAILED: LEFT POPLITEAL SKIN
LOCATION DETAILED: RIGHT PROXIMAL POSTERIOR UPPER ARM
LOCATION DETAILED: LOWER STERNUM
LOCATION DETAILED: RIGHT POPLITEAL SKIN
LOCATION DETAILED: LEFT DISTAL POSTERIOR UPPER ARM
LOCATION DETAILED: RIGHT ANTERIOR DISTAL THIGH
LOCATION DETAILED: LEFT DISTAL POSTERIOR THIGH
LOCATION DETAILED: LEFT LATERAL UPPER BACK
LOCATION DETAILED: RIGHT SUPERIOR UPPER BACK

## 2022-11-22 ASSESSMENT — LOCATION SIMPLE DESCRIPTION DERM
LOCATION SIMPLE: LEFT POSTERIOR UPPER ARM
LOCATION SIMPLE: RIGHT POPLITEAL SKIN
LOCATION SIMPLE: LEFT ELBOW
LOCATION SIMPLE: RIGHT POSTERIOR THIGH
LOCATION SIMPLE: UPPER BACK
LOCATION SIMPLE: LEFT UPPER BACK
LOCATION SIMPLE: LEFT FOREARM
LOCATION SIMPLE: RIGHT LOWER BACK
LOCATION SIMPLE: NOSE
LOCATION SIMPLE: LEFT POPLITEAL SKIN
LOCATION SIMPLE: LEFT LOWER BACK
LOCATION SIMPLE: ABDOMEN
LOCATION SIMPLE: RIGHT THIGH
LOCATION SIMPLE: RIGHT POSTERIOR UPPER ARM
LOCATION SIMPLE: CHEST
LOCATION SIMPLE: LEFT PRETIBIAL REGION
LOCATION SIMPLE: RIGHT FOREARM
LOCATION SIMPLE: LEFT POSTERIOR THIGH
LOCATION SIMPLE: LEFT FOREHEAD
LOCATION SIMPLE: RIGHT BREAST
LOCATION SIMPLE: RIGHT EYEBROW
LOCATION SIMPLE: RIGHT PRETIBIAL REGION
LOCATION SIMPLE: RIGHT UPPER BACK

## 2022-11-22 ASSESSMENT — LOCATION ZONE DERM
LOCATION ZONE: LEG
LOCATION ZONE: ARM
LOCATION ZONE: TRUNK
LOCATION ZONE: NOSE
LOCATION ZONE: FACE

## 2022-11-22 NOTE — PROCEDURE: LIQUID NITROGEN
Render Post-Care Instructions In Note?: no
Post-Care Instructions: I reviewed with the patient in detail post-care instructions. Patient is to wear sunprotection, and avoid picking at any of the treated lesions. Pt may apply Vaseline to crusted or scabbing areas.
Medical Necessity Information: It is in your best interest to select a reason for this procedure from the list below. All of these items fulfill various CMS LCD requirements except the new and changing color options.
Show Spray Paint Technique Variable?: Yes
Detail Level: Detailed
Spray Paint Text: The liquid nitrogen was applied to the skin utilizing a spray paint frosting technique.
Duration Of Freeze Thaw-Cycle (Seconds): 0
Medical Necessity Clause: This procedure was medically necessary because the lesions that were treated were:  If lesion does not resolve, bx is needed.
Consent: The patient's consent was obtained including but not limited to risks of crusting, scabbing, blistering, scarring, darker or lighter pigmentary change, recurrence, incomplete removal and infection.

## 2023-11-22 ENCOUNTER — APPOINTMENT (RX ONLY)
Dept: URBAN - NONMETROPOLITAN AREA CLINIC 15 | Facility: CLINIC | Age: 73
Setting detail: DERMATOLOGY
End: 2023-11-22

## 2023-11-22 DIAGNOSIS — L57.0 ACTINIC KERATOSIS: ICD-10-CM

## 2023-11-22 DIAGNOSIS — I78.8 OTHER DISEASES OF CAPILLARIES: ICD-10-CM

## 2023-11-22 DIAGNOSIS — L81.4 OTHER MELANIN HYPERPIGMENTATION: ICD-10-CM

## 2023-11-22 DIAGNOSIS — L82.1 OTHER SEBORRHEIC KERATOSIS: ICD-10-CM

## 2023-11-22 DIAGNOSIS — D18.0 HEMANGIOMA: ICD-10-CM

## 2023-11-22 DIAGNOSIS — I83.9 ASYMPTOMATIC VARICOSE VEINS OF LOWER EXTREMITIES: ICD-10-CM

## 2023-11-22 DIAGNOSIS — D22 MELANOCYTIC NEVI: ICD-10-CM

## 2023-11-22 PROBLEM — D22.62 MELANOCYTIC NEVI OF LEFT UPPER LIMB, INCLUDING SHOULDER: Status: ACTIVE | Noted: 2023-11-22

## 2023-11-22 PROBLEM — D22.61 MELANOCYTIC NEVI OF RIGHT UPPER LIMB, INCLUDING SHOULDER: Status: ACTIVE | Noted: 2023-11-22

## 2023-11-22 PROBLEM — D22.72 MELANOCYTIC NEVI OF LEFT LOWER LIMB, INCLUDING HIP: Status: ACTIVE | Noted: 2023-11-22

## 2023-11-22 PROBLEM — D22.5 MELANOCYTIC NEVI OF TRUNK: Status: ACTIVE | Noted: 2023-11-22

## 2023-11-22 PROBLEM — I83.92 ASYMPTOMATIC VARICOSE VEINS OF LEFT LOWER EXTREMITY: Status: ACTIVE | Noted: 2023-11-22

## 2023-11-22 PROBLEM — D22.71 MELANOCYTIC NEVI OF RIGHT LOWER LIMB, INCLUDING HIP: Status: ACTIVE | Noted: 2023-11-22

## 2023-11-22 PROBLEM — D18.01 HEMANGIOMA OF SKIN AND SUBCUTANEOUS TISSUE: Status: ACTIVE | Noted: 2023-11-22

## 2023-11-22 PROCEDURE — 99213 OFFICE O/P EST LOW 20 MIN: CPT | Mod: 25

## 2023-11-22 PROCEDURE — ? COUNSELING

## 2023-11-22 PROCEDURE — 17000 DESTRUCT PREMALG LESION: CPT

## 2023-11-22 PROCEDURE — ? LIQUID NITROGEN

## 2023-11-22 ASSESSMENT — LOCATION SIMPLE DESCRIPTION DERM
LOCATION SIMPLE: RIGHT POSTERIOR THIGH
LOCATION SIMPLE: LEFT FOREHEAD
LOCATION SIMPLE: RIGHT POSTERIOR UPPER ARM
LOCATION SIMPLE: LEFT POSTERIOR THIGH
LOCATION SIMPLE: LEFT FOREARM
LOCATION SIMPLE: LEFT UPPER BACK
LOCATION SIMPLE: RIGHT POPLITEAL SKIN
LOCATION SIMPLE: CHEST
LOCATION SIMPLE: LOWER BACK
LOCATION SIMPLE: LEFT PRETIBIAL REGION
LOCATION SIMPLE: UPPER BACK
LOCATION SIMPLE: RIGHT FOREARM
LOCATION SIMPLE: ABDOMEN
LOCATION SIMPLE: RIGHT PRETIBIAL REGION
LOCATION SIMPLE: RIGHT UPPER BACK
LOCATION SIMPLE: NOSE
LOCATION SIMPLE: LEFT ELBOW
LOCATION SIMPLE: RIGHT THIGH
LOCATION SIMPLE: RIGHT EYEBROW
LOCATION SIMPLE: RIGHT LOWER BACK
LOCATION SIMPLE: LEFT POPLITEAL SKIN
LOCATION SIMPLE: LEFT LOWER BACK
LOCATION SIMPLE: LEFT POSTERIOR UPPER ARM

## 2023-11-22 ASSESSMENT — LOCATION DETAILED DESCRIPTION DERM
LOCATION DETAILED: LOWER STERNUM
LOCATION DETAILED: LEFT SUPERIOR MEDIAL MIDBACK
LOCATION DETAILED: SUPERIOR LUMBAR SPINE
LOCATION DETAILED: LEFT SUPERIOR LATERAL LOWER BACK
LOCATION DETAILED: LEFT INFERIOR LATERAL MIDBACK
LOCATION DETAILED: LEFT SUPERIOR LATERAL UPPER BACK
LOCATION DETAILED: LEFT DISTAL POSTERIOR THIGH
LOCATION DETAILED: RIGHT SUPERIOR UPPER BACK
LOCATION DETAILED: LEFT SUPERIOR LATERAL MIDBACK
LOCATION DETAILED: RIGHT MID-UPPER BACK
LOCATION DETAILED: LEFT INFERIOR MEDIAL UPPER BACK
LOCATION DETAILED: RIGHT SUPERIOR MEDIAL UPPER BACK
LOCATION DETAILED: SUBXIPHOID
LOCATION DETAILED: RIGHT RIB CAGE
LOCATION DETAILED: RIGHT POPLITEAL SKIN
LOCATION DETAILED: RIGHT ANTERIOR DISTAL THIGH
LOCATION DETAILED: RIGHT PROXIMAL DORSAL FOREARM
LOCATION DETAILED: RIGHT MEDIAL UPPER BACK
LOCATION DETAILED: RIGHT LATERAL UPPER BACK
LOCATION DETAILED: LEFT MID-UPPER BACK
LOCATION DETAILED: LEFT INFERIOR FOREHEAD
LOCATION DETAILED: RIGHT SUPERIOR MEDIAL MIDBACK
LOCATION DETAILED: LEFT INFERIOR LATERAL UPPER BACK
LOCATION DETAILED: LEFT INFERIOR UPPER BACK
LOCATION DETAILED: RIGHT DISTAL POSTERIOR UPPER ARM
LOCATION DETAILED: INFERIOR THORACIC SPINE
LOCATION DETAILED: RIGHT LATERAL SUPERIOR CHEST
LOCATION DETAILED: RIGHT INFERIOR MEDIAL UPPER BACK
LOCATION DETAILED: RIGHT DISTAL PRETIBIAL REGION
LOCATION DETAILED: LEFT POPLITEAL SKIN
LOCATION DETAILED: LEFT INFERIOR MEDIAL MIDBACK
LOCATION DETAILED: RIGHT INFERIOR LATERAL UPPER BACK
LOCATION DETAILED: LEFT VENTRAL LATERAL PROXIMAL FOREARM
LOCATION DETAILED: RIGHT CENTRAL EYEBROW
LOCATION DETAILED: LEFT DISTAL DORSAL FOREARM
LOCATION DETAILED: MIDDLE STERNUM
LOCATION DETAILED: LEFT PROXIMAL PRETIBIAL REGION
LOCATION DETAILED: RIGHT SUPERIOR LATERAL LOWER BACK
LOCATION DETAILED: RIGHT INFERIOR UPPER BACK
LOCATION DETAILED: LEFT LATERAL ELBOW
LOCATION DETAILED: LEFT DISTAL POSTERIOR UPPER ARM
LOCATION DETAILED: LEFT LATERAL UPPER BACK
LOCATION DETAILED: LEFT SUPERIOR UPPER BACK
LOCATION DETAILED: NASAL SUPRATIP
LOCATION DETAILED: RIGHT DISTAL POSTERIOR THIGH
LOCATION DETAILED: RIGHT VENTRAL PROXIMAL FOREARM
LOCATION DETAILED: RIGHT SUPERIOR LATERAL MIDBACK
LOCATION DETAILED: RIGHT PROXIMAL POSTERIOR UPPER ARM
LOCATION DETAILED: RIGHT PROXIMAL PRETIBIAL REGION

## 2023-11-22 ASSESSMENT — LOCATION ZONE DERM
LOCATION ZONE: FACE
LOCATION ZONE: LEG
LOCATION ZONE: TRUNK
LOCATION ZONE: NOSE
LOCATION ZONE: ARM

## 2023-11-22 NOTE — PROCEDURE: COUNSELING
Calm Prescription Strength Graduated Compression Stockings Recommendations: The patient was counseled that prescription strength graduated compression stockings should be worn for all waking hours. They will follow up with a venous specialist to monitor graduated compression stocking usage and their symptoms.

## 2023-12-06 ENCOUNTER — OFFICE VISIT (OUTPATIENT)
Dept: SLEEP MEDICINE | Facility: MEDICAL CENTER | Age: 73
End: 2023-12-06
Attending: STUDENT IN AN ORGANIZED HEALTH CARE EDUCATION/TRAINING PROGRAM
Payer: MEDICARE

## 2023-12-06 VITALS
BODY MASS INDEX: 30.99 KG/M2 | RESPIRATION RATE: 16 BRPM | WEIGHT: 186 LBS | HEIGHT: 65 IN | DIASTOLIC BLOOD PRESSURE: 76 MMHG | HEART RATE: 61 BPM | OXYGEN SATURATION: 94 % | SYSTOLIC BLOOD PRESSURE: 124 MMHG

## 2023-12-06 DIAGNOSIS — G47.33 OSA (OBSTRUCTIVE SLEEP APNEA): Primary | ICD-10-CM

## 2023-12-06 PROCEDURE — 3074F SYST BP LT 130 MM HG: CPT | Performed by: STUDENT IN AN ORGANIZED HEALTH CARE EDUCATION/TRAINING PROGRAM

## 2023-12-06 PROCEDURE — 99212 OFFICE O/P EST SF 10 MIN: CPT | Performed by: STUDENT IN AN ORGANIZED HEALTH CARE EDUCATION/TRAINING PROGRAM

## 2023-12-06 PROCEDURE — 99203 OFFICE O/P NEW LOW 30 MIN: CPT | Performed by: STUDENT IN AN ORGANIZED HEALTH CARE EDUCATION/TRAINING PROGRAM

## 2023-12-06 PROCEDURE — 3078F DIAST BP <80 MM HG: CPT | Performed by: STUDENT IN AN ORGANIZED HEALTH CARE EDUCATION/TRAINING PROGRAM

## 2023-12-06 ASSESSMENT — PATIENT HEALTH QUESTIONNAIRE - PHQ9: CLINICAL INTERPRETATION OF PHQ2 SCORE: 0

## 2023-12-06 NOTE — PROGRESS NOTES
[facility] Consult Note     Date: 12/6/2023 / Time: 9:31 AM      Thank you for requesting a sleep medicine consultation on Linda Walton at the sleep center. Presents today with the chief complaints of discussing inspire therapy. She is referred by Pepe Plunkett M.D.  [place] [id number] for evaluation and treatment of obstructive sleep apnea.     HISTORY OF PRESENT ILLNESS:     Linda Walton is a 73 y.o. female with depression, obesity, and obstructive sleep apnea on CPAP.  Presents to Sleep Clinic for evaluation of obstructive sleep apnea.     She states she has been on CPAP for many years.  She believes she is on her third machine.  Her current machine is over 5 years old.  It is a DreamStation is affected by the RespirQlouds recall.  She has not registered her for the recall.  She currently does not have a DME and is paying out-of-pocket.  She is using her CPAP machine regularly.  She states that she has difficulty with using her machine at times as her nasal pillows can irritate her nose.  She is curious about inspire and if she would be a candidate.    She underwent a home sleep study in August 2022 which showed severe obstructive sleep apnea with an overall 4% AHI 48.  Study was a sleep quality report study which did not indicate central apneas.  She then underwent a PSG titration study 12/29/2022 which showed improvement in respiratory events with PAP therapy.  It was recommended she continue using CPAP.    She does find she sleeps better with CPAP.  However she does not like her CPAP and is looking at other alternatives.    DME provider: Currently buying online  Device: CPAP  Mask: nasal pillow   Aerophagia: Yes slight   Snoring: Yes  Dry mouth: Yes  Leak: No   Skin irritation: Yes  Chin strap: No       As per supplemental questionnaire to be scanned or imported into chart:    Markle Sleepiness Score: 7    Sleep Schedule  Bedtime: Weekday & Weekend  "8pm  Wake time: Weekday & Weekend 430-530am  Sleep-onset latency: mins   Awakenings from sleep: 1  Difficulty falling back asleep: No  Bedroom partner: yes  Naps: Yes, sometimes     DAYTIME SYMPTOMS:   Excessive daytime sleepiness: No   Daytime fatigue: Yes, slight   Difficulty concentrating: No   Memory problems: Yes slight   Irritability:No   Work/school performance issues: No , not working   Sleepiness with driving: No   Caffeine/stimulant use: Yes  Alcohol use:No     SLEEP RELATED SYMPTOMS  Snoring: Yes  Witnessed apnea or gasping/choking: No   Dry mouth or mouth breathing: No   Sweating: No   Teeth grinding/biting: Yes  Morning headaches: Yes sometimes   Refreshed Upon Awakening: Yes     SLEEP RELATED BEHAVIORS:  Parasomnias (walking, talking, eating, violence): No   Leg kicking: No   Restless legs - \"urge to move\": Yes sometimes   Nightmares: Yes Recurrent: No   Dream enactment: No      NARCOLEPSY:  Cataplexy: No   Sleep paralysis: No   Sleep attacks: No   Hypnagogic/hypnopompic hallucinations: No     MEDICAL HISTORY  Past Medical History:   Diagnosis Date    Anxiety     Depression     Depression 3/24/2016    DVT (deep venous thrombosis) (HCC) 3/24/2016    left leg    Edema 3/24/2016    GERD (gastroesophageal reflux disease)     Heart burn     Hiatus hernia syndrome     High cholesterol     Hyperlipidemia 3/24/2016    Sleep apnea     CPAP    Snoring     Varicose vein of leg 3/24/2016        SURGICAL HISTORY  Past Surgical History:   Procedure Laterality Date    GASTROSCOPY-ENDO N/A 1/20/2017    Procedure: GASTROSCOPY-ENDO W/ENDOSCOPIC MUCOSAL RESECTION OF DUODENAL ADENOMA AND OTHER POSSIBLE ENDOTHERAPY;  Surgeon: Edy Barrett M.D.;  Location: SURGERY [facility];  Service:     TONSILLECTOMY  age 13        FAMILY HISTORY  Family History   Problem Relation Age of Onset    Alcohol/Drug Brother     Cancer Father         lung    Blood Disease Neg Hx        SOCIAL HISTORY  Social History     Socioeconomic " "History    Marital status:    Tobacco Use    Smoking status: Former     Current packs/day: 0.00     Average packs/day: 3.0 packs/day for 5.0 years (15.0 ttl pk-yrs)     Types: Cigarettes     Start date: 1974     Quit date: 1979     Years since quittin.9    Smokeless tobacco: Never   Substance and Sexual Activity    Alcohol use: Yes     Alcohol/week: 0.0 oz     Comment: once a month    Drug use: No    Sexual activity: Not Currently   Social History Narrative     works from home        Occupation: Retired     CURRENT MEDICATIONS  Current Outpatient Medications   Medication Sig Dispense Refill    IRON PO Take  by mouth.      Cholecalciferol (VITAMIN D3) 2000 UNIT Cap Take  by mouth every day.      cyanocobalamin (VITAMIN B-12) 500 MCG Tab Take 500 mcg by mouth every day.      citalopram (CELEXA) 40 MG TABS Take 40 mg by mouth every day.      atorvastatin (LIPITOR) 40 MG Tab Take 40 mg by mouth every evening. (Patient not taking: Reported on 2023)      furosemide (LASIX) 40 MG Tab Take 40 mg by mouth every day. (Patient not taking: Reported on 2023)      potassium chloride SA (K-DUR) 20 MEQ Tab CR Take 20 mEq by mouth every day. (Patient not taking: Reported on 2023)      omeprazole (PRILOSEC) 20 MG CPDR Take 20 mg by mouth every day. (Patient not taking: Reported on 2023)       No current facility-administered medications for this visit.       REVIEW OF SYSTEMS  Constitutional: Denies fevers, Denies weight changes  Ears/Nose/Throat/Mouth: Denies nasal congestion or sore throat   Cardiovascular: Denies chest pain  Respiratory: Denies shortness of breath, Denies cough  Gastrointestinal/Hepatic: Denies nausea, vomiting  Sleep: see HPI    Physical Examination:  Vitals/ General Appearance:   Weight/BMI: Body mass index is 30.95 kg/m².  /76 (BP Location: Left arm, Patient Position: Sitting, BP Cuff Size: Adult)   Pulse 61   Resp 16   Ht 1.651 m (5' 5\")   Wt 84.4 kg " "(186 lb)   SpO2 94%   Vitals:    12/06/23 0934   BP: 124/76   BP Location: Left arm   Patient Position: Sitting   BP Cuff Size: Adult   Pulse: 61   Resp: 16   SpO2: 94%   Weight: 84.4 kg (186 lb)   Height: 1.651 m (5' 5\")       Pt. is alert and oriented to time, place and person. Cooperative and in no apparent distress.     Constitutional: Alert, no distress, well-groomed.  Skin: No rashes in visible areas.  Eye: Round. Conjunctiva clear, lids normal. No icterus.   ENT EXAM  Nasal alae/valves collapsible: No   Nasal septum deviation: No   Nasal turbinate hypertrophy: Left: Grade 1   Right: Grade 1  Hard palate narrow: No   Hard palate high: No   Soft palate/uvula (Mallampati score): 4  Tongue Scalloping: Yes  Retrognathia: No   Micrognathia: No   Cardiovascular:no murmus/gallops/rubs, normal S1 and S2 heart sounds, regular rate and rhythm  Pulmonary:Clear to auscultation, No wheezes, No crackles.  Neurologic:Awake, alert and oriented x 3, Normal age appropriate gait, No involuntary motions.  Extremities: No clubbing, cyanosis, or edema       ASSESSMENT AND PLAN   Linda Walton is a 73 y.o. female with depression, obesity, and obstructive sleep apnea on CPAP.  Presents to Sleep Clinic for evaluation of obstructive sleep apnea.     1. Obstructive sleep apnea  The medical record was reviewed.    Reviewed previous home sleep assessment and PSG titration study from 2022.  Discussed inspire device mechanism of action and care pathway and efficacy.  Advised that if she was interested in inspire she would need a updated sleep study exploring if she had any central apneas.  Her in lab study was a titration study and would not qualify for meeting criteria for inspire device.    She reports continued use and benefit from her CPAP machine.  Her CPAP machine is affected by the Respironics recall and she has not registered the machine.  Advised for her to register her machine.    After discussion patient would not like " to proceed with inspire therapy.  She would like to get a new machine as her current machine is over 5 years old.  She does not currently have a DME and prefers to get set up with a DME here in [place].    PLAN:   -Order placed for new auto CPAP 11-15 cmH2O  -Advised to reach out via MyChart with questions     Has been advised to continue the current CPAP, clean equipment frequently, and get new mask and supplies as allowed by insurance and DME. Recommend an earlier appointment, if significant treatment barriers develop.    Patients with GENE are at increased risk of cardiovascular disease including coronary artery disease, systemic arterial hypertension, pulmonary arterial hypertension, cardiac arrythmias, and stroke. The patient was advised to avoid driving a motor vehicle when drowsy.    Positive airway pressure will favorably impact many of the adverse conditions and effects provoked by GENE.    Have advised the patient to follow up with the appropriate healthcare practitioners for all other medical problems and issues.    Return in about 3 months (around 3/6/2024).        2.  Regarding treatment of other past medical problems and general health maintenance,  Pt is urged to follow up with PCP.      Please note portions of this record was created using voice recognition software. I have made every reasonable attempt to correct obvious errors, but I expect that there are errors of grammar and possibly content I did not discover before finalizing the note.

## 2024-03-07 ENCOUNTER — APPOINTMENT (OUTPATIENT)
Dept: SLEEP MEDICINE | Facility: MEDICAL CENTER | Age: 74
End: 2024-03-07
Attending: STUDENT IN AN ORGANIZED HEALTH CARE EDUCATION/TRAINING PROGRAM
Payer: MEDICARE

## 2024-03-18 ENCOUNTER — APPOINTMENT (OUTPATIENT)
Dept: SLEEP MEDICINE | Facility: MEDICAL CENTER | Age: 74
End: 2024-03-18
Attending: INTERNAL MEDICINE
Payer: MEDICARE

## 2024-03-28 ENCOUNTER — OFFICE VISIT (OUTPATIENT)
Dept: SLEEP MEDICINE | Facility: MEDICAL CENTER | Age: 74
End: 2024-03-28
Attending: PHYSICIAN ASSISTANT
Payer: MEDICARE

## 2024-03-28 VITALS
HEART RATE: 56 BPM | OXYGEN SATURATION: 93 % | SYSTOLIC BLOOD PRESSURE: 122 MMHG | WEIGHT: 187 LBS | DIASTOLIC BLOOD PRESSURE: 76 MMHG | HEIGHT: 65 IN | RESPIRATION RATE: 19 BRPM | BODY MASS INDEX: 31.16 KG/M2

## 2024-03-28 DIAGNOSIS — E66.9 OBESITY (BMI 30.0-34.9): ICD-10-CM

## 2024-03-28 DIAGNOSIS — G47.33 OSA (OBSTRUCTIVE SLEEP APNEA): ICD-10-CM

## 2024-03-28 PROCEDURE — 99213 OFFICE O/P EST LOW 20 MIN: CPT | Performed by: PHYSICIAN ASSISTANT

## 2024-03-28 PROCEDURE — 3078F DIAST BP <80 MM HG: CPT | Performed by: PHYSICIAN ASSISTANT

## 2024-03-28 PROCEDURE — 3074F SYST BP LT 130 MM HG: CPT | Performed by: PHYSICIAN ASSISTANT

## 2024-03-28 PROCEDURE — 99211 OFF/OP EST MAY X REQ PHY/QHP: CPT | Performed by: PHYSICIAN ASSISTANT

## 2024-03-28 ASSESSMENT — ENCOUNTER SYMPTOMS
ORTHOPNEA: 0
TREMORS: 0
SPUTUM PRODUCTION: 1
HEADACHES: 1
SORE THROAT: 0
HEARTBURN: 0
DIZZINESS: 0
PALPITATIONS: 0
ROS GI COMMENTS: NO DENTURES, NO MISSING TEETH, NO DIFFICULTY SWALLOWING
FEVER: 0
WEIGHT LOSS: 0
CHILLS: 0
SHORTNESS OF BREATH: 1
INSOMNIA: 0
WHEEZING: 0
SINUS PAIN: 0
COUGH: 1

## 2024-03-28 NOTE — PATIENT INSTRUCTIONS
1-reviewed compliance which is excellent, demonstrating use and benefit   2-significant moderate mask leak using nasal pillows, size small, question trial medium  3-new order mask and supplies placed, can trial chin strap for additional use   4-consider nelson red chin strap  5-sleeps on back mostly  6-Today we reviewed equipment cleaning  once weekly minimum  mask, tubing and water chamber  use dedicated container  use mild soap and water  SoClean or other ozone  are not recommended  white vinegar and water solution is no longer recommended  hang tubing to dry  mask sanitizing wipes are an option for use   7-As a reminder use distilled water only in humidifier chamber.    8-Equipment replacement schedule : Nasal pillows 2 times per month, Head gear every 6 months, Tubing every 3 months, Ultra-fine filters 2 times per month, Humidifier chamber every 6 months  9-humidifier increased to 4  10-3 months follow up

## 2024-03-28 NOTE — PROGRESS NOTES
"Chief Complaint   Patient presents with    Follow-Up     Last seen 12/6/23 w/ Dr. Blair for GENE         HPI:  Linda Walton is a 74 y.o. year old female here today for follow-up on {diagnosis:69087}.    Past Medical History: ***    Vitals:  /76 (BP Location: Left arm, Patient Position: Sitting, BP Cuff Size: Adult)   Pulse (!) 56   Resp 19   Ht 1.651 m (5' 5\")   Wt 84.8 kg (187 lb)   SpO2 93%     Recent Imaging: ***    Currently using  Resmed auto CPAP @11-15 cm H20 pressure; compliance reviewed for 2/27/2024 through 3/27/2024, days used 30/30, average daily usage 7 hours 14 minutes, 100% of days greater than or equal to 4 hours, mask leak at 27.5 LPM at 95th percentile, AHI 5 per hour.  See media for full report    Device obtained ***   DME provider {DME:43000}  Mask interface ***   Polysomnogram ***       Sleep schedule {schedule:12478}  Symptoms {symptoms:75595}    Pound Sleepiness Scale No data recorded   Stop Bang Score No data recorded         Review of Systems   Constitutional:  Positive for malaise/fatigue. Negative for chills, fever and weight loss.   HENT:  Positive for hearing loss (left) and nosebleeds (right nostril from dryness). Negative for congestion, sinus pain, sore throat and tinnitus.    Eyes:         Presc glasses   Respiratory:  Positive for cough (mild), sputum production (scant light yellow to clear) and shortness of breath (exertion). Negative for wheezing.    Cardiovascular:  Positive for leg swelling (trace). Negative for chest pain, palpitations and orthopnea.   Gastrointestinal:  Negative for heartburn.        No dentures, no missing teeth, no difficulty swallowing   Neurological:  Positive for headaches (occasional from grinding teeth). Negative for dizziness and tremors.   Psychiatric/Behavioral:  The patient does not have insomnia.        Past Medical History:   Diagnosis Date    Anxiety     Depression     Depression 3/24/2016    DVT (deep venous thrombosis) " (HCC) 3/24/2016    left leg    Edema 3/24/2016    GERD (gastroesophageal reflux disease)     Heart burn     Hiatus hernia syndrome     High cholesterol     Hyperlipidemia 3/24/2016    Sleep apnea     CPAP    Snoring     Varicose vein of leg 3/24/2016       Past Surgical History:   Procedure Laterality Date    GASTROSCOPY-ENDO N/A 2017    Procedure: GASTROSCOPY-ENDO W/ENDOSCOPIC MUCOSAL RESECTION OF DUODENAL ADENOMA AND OTHER POSSIBLE ENDOTHERAPY;  Surgeon: Edy Barrett M.D.;  Location: SURGERY [facility];  Service:     TONSILLECTOMY  age 13       Family History   Problem Relation Age of Onset    Alcohol/Drug Brother     Cancer Father         lung    Blood Disease Neg Hx        Social History     Socioeconomic History    Marital status:      Spouse name: Not on file    Number of children: Not on file    Years of education: Not on file    Highest education level: Not on file   Occupational History    Not on file   Tobacco Use    Smoking status: Former     Current packs/day: 0.00     Average packs/day: 3.0 packs/day for 5.0 years (15.0 ttl pk-yrs)     Types: Cigarettes     Start date: 1974     Quit date: 1979     Years since quittin.2    Smokeless tobacco: Never   Substance and Sexual Activity    Alcohol use: Yes     Alcohol/week: 0.0 oz     Comment: once a month    Drug use: No    Sexual activity: Not Currently   Other Topics Concern    Not on file   Social History Narrative     works from home     Social Determinants of Health     Financial Resource Strain: Not on file   Food Insecurity: Not on file   Transportation Needs: Not on file   Physical Activity: Not on file   Stress: Not on file   Social Connections: Not on file   Intimate Partner Violence: Not on file   Housing Stability: Not on file       Allergies as of 2024    (No Known Allergies)          Current medications as of today   Current Outpatient Medications   Medication Sig Dispense Refill    cyanocobalamin  (VITAMIN B-12) 500 MCG Tab Take 500 mcg by mouth every day.      citalopram (CELEXA) 40 MG TABS Take 40 mg by mouth every day.      IRON PO Take  by mouth. (Patient not taking: Reported on 3/28/2024)      Cholecalciferol (VITAMIN D3) 2000 UNIT Cap Take  by mouth every day.      atorvastatin (LIPITOR) 40 MG Tab Take 40 mg by mouth every evening. (Patient not taking: Reported on 12/6/2023)      furosemide (LASIX) 40 MG Tab Take 40 mg by mouth every day. (Patient not taking: Reported on 12/6/2023)      potassium chloride SA (K-DUR) 20 MEQ Tab CR Take 20 mEq by mouth every day. (Patient not taking: Reported on 12/6/2023)      omeprazole (PRILOSEC) 20 MG CPDR Take 20 mg by mouth every day. (Patient not taking: Reported on 12/6/2023)       No current facility-administered medications for this visit.         Physical Exam:   Gen:           Alert and oriented, No apparent distress. Mood and affect appropriate, normal interaction with examiner.   Hearing:     Grossly intact.  Nose:          Normal, no lesions or deformities.  Dentition:    Good dentition.   Oropharynx:   Tongue normal, posterior pharynx without erythema or exudate.  Mallampati Classification: ***  Neck:        Supple, trachea midline, no masses.  Respiratory Effort: No intercostal retractions or use of accessory muscles.   Gait and Station: Normal.  Digits and Nails: No clubbing, cyanosis, petechiae, or nodes.   Skin:        No rashes, lesions or ulcers noted.               Ext:           No cyanosis or edema.      Immunizations:  Flu:***  Pneumovax 23:***  Prevnar 13:***  PCV 20: ***  SARS CoV2 Vaccine: ***, ***    Assessment / Plan:  1. GENE (obstructive sleep apnea)  - DME Other    Reviewed compliance which is excellent, patient demonstrating use and benefit.  Significant mask leak however noted using nasal pillows size small, question trial medium.  Also consider chinstrap as patient reports sleeping primarily on her back within flat pillow.  We did  review equipment cleaning as well equipment replacement schedule.  Patient reminded to use distilled water only in humidifier chamber.  Humidity increased to 4 on patient unit due to complaints of nasal dryness.  Patient is continuing to snore again likely related to mouth breathing with nasal pillows, reviewed use of chinstrap versus alternative mask.  Short-term follow-up to reassess for improved mask leak.    2. Obesity (BMI 30.0-34.9)    Elevated BMI: Discussion regarding impact central adiposity on pulmonary function.  Encouraged to increase activity as tolerated and monitor nutritional intake.        Follow-up:   Return in about 3 months (around 6/28/2024) for Return with Tonia Boudreaux PA-C.    Please note that this dictation was created using voice recognition software. I have made every reasonable attempt to correct obvious errors, but it is possible there are errors of grammar and possibly content that I did not discover before finalizing the note.

## 2024-08-13 ENCOUNTER — OFFICE VISIT (OUTPATIENT)
Dept: SLEEP MEDICINE | Facility: MEDICAL CENTER | Age: 74
End: 2024-08-13
Attending: PHYSICIAN ASSISTANT
Payer: MEDICARE

## 2024-08-13 VITALS
HEIGHT: 65 IN | BODY MASS INDEX: 30.32 KG/M2 | DIASTOLIC BLOOD PRESSURE: 68 MMHG | HEART RATE: 66 BPM | OXYGEN SATURATION: 94 % | WEIGHT: 182 LBS | SYSTOLIC BLOOD PRESSURE: 110 MMHG

## 2024-08-13 DIAGNOSIS — G47.33 OSA (OBSTRUCTIVE SLEEP APNEA): ICD-10-CM

## 2024-08-13 PROCEDURE — 3078F DIAST BP <80 MM HG: CPT | Performed by: PHYSICIAN ASSISTANT

## 2024-08-13 PROCEDURE — 99213 OFFICE O/P EST LOW 20 MIN: CPT | Performed by: PHYSICIAN ASSISTANT

## 2024-08-13 PROCEDURE — 3074F SYST BP LT 130 MM HG: CPT | Performed by: PHYSICIAN ASSISTANT

## 2024-08-13 ASSESSMENT — ENCOUNTER SYMPTOMS
PALPITATIONS: 0
WHEEZING: 1
SORE THROAT: 0
ORTHOPNEA: 0
FEVER: 0
HEADACHES: 0
HEARTBURN: 0
SINUS PAIN: 0
CHILLS: 0
SHORTNESS OF BREATH: 1
ROS GI COMMENTS: NO DENTURES, NO MISSING TEETH OR SWALLOWING ISSUES
TREMORS: 1
COUGH: 0
WEIGHT LOSS: 0
SPUTUM PRODUCTION: 0
DIZZINESS: 0

## 2024-08-13 NOTE — PATIENT INSTRUCTIONS
1-reviewed compliance  2-demonstrating use and benefit  3-using nasal mask with chin strap; mild leak noted  4-apneas at 5.7, continue to monitor   5-follow up in one year, sooner if needed   -morning headaches, sleepiness during day, decreased tolerance  6-As a reminder use distilled water only in humidifier chamber.    7-Today we reviewed equipment cleaning  once weekly minimum  mask, tubing and water chamber  use dedicated container  use mild soap and water  SoClean or other ozone  are not recommended  white vinegar and water solution is no longer recommended  hang tubing to dry  mask sanitizing wipes are an option for use   8-Equipment replacement schedule : Mask cushion every month, Nasal pillows 2 times per month, Head gear every 6 months, Tubing every 3 months, Ultra-fine filters 2 times per month, Humidifier chamber every 6 months

## 2024-08-13 NOTE — PROGRESS NOTES
"Chief Complaint   Patient presents with    Follow-Up     Last seen 3/28/24 LUIS ARMANDO Boudreaux PA-C         HPI:  Linda Walton is a 74 y.o. year old female here today for follow-up on obstructive sleep apnea.  Patient last seen in clinic 3/28/2024 by CIPRIANO Rodriguez.  Previously evaluated by Dr. Edgard Blair on 12/6/2023.    Past Medical History: DVT, GERD, hiatal hernia syndrome, edema, depression, elevated BMI.     Vitals:  /68 (BP Location: Right arm, Patient Position: Sitting, BP Cuff Size: Adult)   Pulse 66   Ht 1.651 m (5' 5\")   Wt 82.6 kg (182 lb)   SpO2 94% BMI of 30.29 kg/m².    Recent Imaging:  DVT, GERD, hiatal hernia syndrome, edema, depression, elevated BMI.     Currently using  Resmed auto CPAP @11-16 cm H20 pressure; compliance reviewed for 7/14/2024 through 8/12/2024, days used 30/30, average daily usage 8 hours 4 minutes, 100% of days greater than or equal to 4 hours, mask leak at 19.4 LPM at 95th percentile, AHI 5.7 per hour.  See media for full report.    Device obtained January 2024  DME provider [facility]   Mask interface nasal pillows    Home sleep study obtained August 2022 demonstrated findings consistent with severe obstructive sleep apnea with pAHI of 48 events per hour.    Polysomnogram titration study obtained 12/29/2022 showed improvement in respiratory events with PAP therapy with recommendation to continue CPAP.  Low O2 sat of 87% with sats less than or equal to 88 for 5 minutes.    Sleep schedule goes to bed 8-9 p.m., wakens 5 30-6 a.m. , and gets up during the night bathroom x 1   Symptoms denies day time somnolence and denies morning headache    Dillard Sleepiness Scale reported as 7/24 on 12/6/2023        Review of Systems   Constitutional:  Positive for malaise/fatigue. Negative for chills, fever and weight loss.   HENT:  Positive for congestion (allergies) and hearing loss (left ear). Negative for nosebleeds, sinus pain, sore throat and tinnitus.    Eyes:       "   New glasses    Respiratory:  Positive for shortness of breath (with activity) and wheezing (at night occasional). Negative for cough and sputum production.    Cardiovascular:  Positive for leg swelling (improved). Negative for chest pain, palpitations and orthopnea.   Gastrointestinal:  Negative for heartburn.        No dentures, no missing teeth or swallowing issues    Neurological:  Positive for tremors. Negative for dizziness and headaches.       Past Medical History:   Diagnosis Date    Anxiety     Depression     Depression 3/24/2016    DVT (deep venous thrombosis) (HCC) 3/24/2016    left leg    Edema 3/24/2016    GERD (gastroesophageal reflux disease)     Heart burn     Hiatus hernia syndrome     High cholesterol     Hyperlipidemia 3/24/2016    Sleep apnea     CPAP    Snoring     Varicose vein of leg 3/24/2016       Past Surgical History:   Procedure Laterality Date    GASTROSCOPY-ENDO N/A 2017    Procedure: GASTROSCOPY-ENDO W/ENDOSCOPIC MUCOSAL RESECTION OF DUODENAL ADENOMA AND OTHER POSSIBLE ENDOTHERAPY;  Surgeon: Edy Barrett M.D.;  Location: SURGERY [facility];  Service:     TONSILLECTOMY  age 13       Family History   Problem Relation Age of Onset    Alcohol/Drug Brother     Cancer Father         lung    Blood Disease Neg Hx        Social History     Socioeconomic History    Marital status:      Spouse name: Not on file    Number of children: Not on file    Years of education: Not on file    Highest education level: Not on file   Occupational History    Not on file   Tobacco Use    Smoking status: Former     Current packs/day: 0.00     Average packs/day: 3.0 packs/day for 5.0 years (15.0 ttl pk-yrs)     Types: Cigarettes     Start date: 1974     Quit date: 1979     Years since quittin.6    Smokeless tobacco: Never   Substance and Sexual Activity    Alcohol use: Yes     Alcohol/week: 0.0 oz     Comment: once a month    Drug use: No    Sexual activity: Not Currently    Other Topics Concern    Not on file   Social History Narrative     works from home     Social Determinants of Health     Financial Resource Strain: Not on file   Food Insecurity: Not on file   Transportation Needs: Not on file   Physical Activity: Not on file   Stress: Not on file   Social Connections: Not on file   Intimate Partner Violence: Not on file   Housing Stability: Not on file       Allergies as of 08/13/2024    (No Known Allergies)          Current medications as of today   Current Outpatient Medications   Medication Sig Dispense Refill    IRON PO Take  by mouth.      Cholecalciferol (VITAMIN D3) 2000 UNIT Cap Take  by mouth every day.      cyanocobalamin (VITAMIN B-12) 500 MCG Tab Take 500 mcg by mouth every day.      citalopram (CELEXA) 40 MG TABS Take 40 mg by mouth every day.      atorvastatin (LIPITOR) 40 MG Tab Take 40 mg by mouth every evening. (Patient not taking: Reported on 12/6/2023)      furosemide (LASIX) 40 MG Tab Take 40 mg by mouth every day. (Patient not taking: Reported on 12/6/2023)      potassium chloride SA (K-DUR) 20 MEQ Tab CR Take 20 mEq by mouth every day. (Patient not taking: Reported on 12/6/2023)      omeprazole (PRILOSEC) 20 MG CPDR Take 20 mg by mouth every day. (Patient not taking: Reported on 12/6/2023)       No current facility-administered medications for this visit.         Physical Exam:   Gen:           Alert and oriented, No apparent distress. Mood and affect appropriate, normal interaction with examiner.   Hearing:     Grossly intact.  Nose:          Normal, no lesions or deformities.  Dentition:    fair dentition.   Oropharynx:   Tongue normal, posterior pharynx without erythema or exudate.  Mallampati Classification: IV  Neck:        Supple, trachea midline, no masses.  Respiratory Effort: No intercostal retractions or use of accessory muscles.   Gait and Station: Normal.  Digits and Nails: No clubbing, cyanosis, petechiae, or nodes.   Skin:        No  rashes, lesions or ulcers noted.               Ext:           No cyanosis or edema.      Immunizations:  Flu: 9/13/2023  Moderna SARS CoV2 Vaccine: 1/8/2022, 5/9/2021, 4/11/2021    Assessment / Plan:  1. GENE (obstructive sleep apnea)    Reviewed compliance, demonstrating use and benefit.  Patient is using a nasal mask with chinstrap and very mild mask leak noted.  Apneas near target zone.  Continue to monitor.  Reminded to use distilled water only, equipment cleaning and equipment placement schedule reviewed.  Patient to follow-up in 1 year or sooner if needed.      Follow-up:   No follow-ups on file.    Please note that this dictation was created using voice recognition software. I have made every reasonable attempt to correct obvious errors, but it is possible there are errors of grammar and possibly content that I did not discover before finalizing the note.

## 2025-03-25 NOTE — PROCEDURE: LIQUID NITROGEN
Show Applicator Variable?: Yes
Render Note In Bullet Format When Appropriate: No
Duration Of Freeze Thaw-Cycle (Seconds): 0
Post-Care Instructions: I reviewed with the patient in detail post-care instructions. Patient is to wear sunprotection, and avoid picking at any of the treated lesions. Pt may apply Vaseline to crusted or scabbing areas.
Consent: The patient's consent was obtained including but not limited to risks of crusting, scabbing, blistering, scarring, darker or lighter pigmentary change, recurrence, incomplete removal and infection.
Detail Level: Detailed
Medical Necessity Clause: This procedure was medically necessary because the lesions that were treated were:  If lesion does not resolve, bx is needed.
Medical Necessity Information: It is in your best interest to select a reason for this procedure from the list below. All of these items fulfill various CMS LCD requirements except the new and changing color options.
Spray Paint Text: The liquid nitrogen was applied to the skin utilizing a spray paint frosting technique.
no

## 2025-07-24 ENCOUNTER — APPOINTMENT (OUTPATIENT)
Dept: URBAN - METROPOLITAN AREA CLINIC 6 | Facility: CLINIC | Age: 75
Setting detail: DERMATOLOGY
End: 2025-07-24

## 2025-07-24 DIAGNOSIS — L43.8 OTHER LICHEN PLANUS: ICD-10-CM

## 2025-07-24 DIAGNOSIS — D17 BENIGN LIPOMATOUS NEOPLASM: ICD-10-CM

## 2025-07-24 DIAGNOSIS — D492 NEOPLASM OF UNSPECIFIED NATURE OF BONE, SOFT TISSUE, AND SKIN: ICD-10-CM

## 2025-07-24 DIAGNOSIS — I78.8 OTHER DISEASES OF CAPILLARIES: ICD-10-CM

## 2025-07-24 PROBLEM — R22.42 LOCALIZED SWELLING, MASS AND LUMP, LEFT LOWER LIMB: Status: ACTIVE | Noted: 2025-07-24

## 2025-07-24 PROBLEM — D17.22 BENIGN LIPOMATOUS NEOPLASM OF SKIN AND SUBCUTANEOUS TISSUE OF LEFT ARM: Status: ACTIVE | Noted: 2025-07-24

## 2025-07-24 PROCEDURE — ? COUNSELING

## 2025-07-24 PROCEDURE — ? PRESCRIPTION

## 2025-07-24 PROCEDURE — ? ORDER TESTS

## 2025-07-24 PROCEDURE — ? DIAGNOSIS COMMENT

## 2025-07-24 RX ORDER — MOMETASONE FUROATE 1 MG/G
1 OINTMENT TOPICAL BID
Qty: 45 | Refills: 2 | Status: ERX | COMMUNITY
Start: 2025-07-24

## 2025-07-24 RX ADMIN — MOMETASONE FUROATE 1: 1 OINTMENT TOPICAL at 00:00

## 2025-07-24 ASSESSMENT — LOCATION SIMPLE DESCRIPTION DERM
LOCATION SIMPLE: LEFT PRETIBIAL REGION
LOCATION SIMPLE: LEFT FOREARM

## 2025-07-24 ASSESSMENT — LOCATION ZONE DERM
LOCATION ZONE: ARM
LOCATION ZONE: LEG

## 2025-07-24 ASSESSMENT — LOCATION DETAILED DESCRIPTION DERM
LOCATION DETAILED: LEFT PROXIMAL PRETIBIAL REGION
LOCATION DETAILED: LEFT MEDIAL DISTAL PRETIBIAL REGION
LOCATION DETAILED: LEFT DISTAL PRETIBIAL REGION
LOCATION DETAILED: LEFT VENTRAL PROXIMAL FOREARM

## 2025-07-29 ENCOUNTER — APPOINTMENT (OUTPATIENT)
Dept: URBAN - METROPOLITAN AREA CLINIC 6 | Facility: CLINIC | Age: 75
Setting detail: DERMATOLOGY
End: 2025-07-29

## 2025-07-29 DIAGNOSIS — D492 NEOPLASM OF UNSPECIFIED NATURE OF BONE, SOFT TISSUE, AND SKIN: ICD-10-CM

## 2025-07-29 PROBLEM — R22.42 LOCALIZED SWELLING, MASS AND LUMP, LEFT LOWER LIMB: Status: ACTIVE | Noted: 2025-07-29

## 2025-07-29 PROCEDURE — ? ORDER TESTS

## 2025-07-29 PROCEDURE — ? COUNSELING

## 2025-07-29 PROCEDURE — ? DIAGNOSIS COMMENT

## 2025-07-29 ASSESSMENT — LOCATION DETAILED DESCRIPTION DERM
LOCATION DETAILED: LEFT MEDIAL DISTAL PRETIBIAL REGION
LOCATION DETAILED: LEFT PROXIMAL PRETIBIAL REGION

## 2025-07-29 ASSESSMENT — LOCATION ZONE DERM: LOCATION ZONE: LEG

## 2025-07-29 ASSESSMENT — LOCATION SIMPLE DESCRIPTION DERM: LOCATION SIMPLE: LEFT PRETIBIAL REGION

## (undated) DEVICE — SYRINGE DISP. 50CC LS - (40/BX)

## (undated) DEVICE — CATHETER IV SAFETY 22 GA X 1 (50EA/BX)

## (undated) DEVICE — LACTATED RINGERS INJ 1000 ML - (14EA/CA 60CA/PF)

## (undated) DEVICE — GLOVE, LITE (PAIR)

## (undated) DEVICE — SET EXTENSION WITH 2 PORTS (48EA/CA) ***PART #2C8610 IS A SUBSTITUTE*****

## (undated) DEVICE — MASK ANESTHESIA ADULT  - (100/CA)

## (undated) DEVICE — ELECTRODE 850 FOAM ADHESIVE - HYDROGEL RADIOTRNSPRNT (50/PK)

## (undated) DEVICE — TUBING CLEARLINK DUO-VENT - C-FLO (48EA/CA)

## (undated) DEVICE — ELECTRODE DUAL RETURN W/ CORD - (50/PK)

## (undated) DEVICE — GOWN SURGEONS LARGE - (32/CA)

## (undated) DEVICE — KIT ANESTHESIA W/CIRCUIT & 3/LT BAG W/FILTER (20EA/CA)

## (undated) DEVICE — CATHETER IV SAFETY 20 GA X 1-1/4 (50/BX)

## (undated) DEVICE — TUBE SHILEY ENDOTRACHEAL ORAL RAE CUFFED 7.0MM WITH TAPERGUARD (10EA/PK)

## (undated) DEVICE — CATHETER IV SAFETY 24 GA X 3/4 (50EA/BX)

## (undated) DEVICE — SENSOR SPO2 ADULT LNCS ADTX (20/BX) ORDER ITEM #19593

## (undated) DEVICE — TUBE SUCTION YANKAUER  1/4 X 6FT (20EA/CA)"

## (undated) DEVICE — TUBE CONNECTING SUCTION - CLEAR PLASTIC STERILE 72 IN (50EA/CA)